# Patient Record
Sex: FEMALE | Race: WHITE | NOT HISPANIC OR LATINO | Employment: FULL TIME | URBAN - METROPOLITAN AREA
[De-identification: names, ages, dates, MRNs, and addresses within clinical notes are randomized per-mention and may not be internally consistent; named-entity substitution may affect disease eponyms.]

---

## 2017-02-09 ENCOUNTER — OFFICE VISIT (OUTPATIENT)
Dept: INTERNAL MEDICINE | Facility: CLINIC | Age: 22
End: 2017-02-09
Payer: COMMERCIAL

## 2017-02-09 ENCOUNTER — LAB VISIT (OUTPATIENT)
Dept: LAB | Facility: HOSPITAL | Age: 22
End: 2017-02-09
Attending: INTERNAL MEDICINE
Payer: COMMERCIAL

## 2017-02-09 ENCOUNTER — PATIENT MESSAGE (OUTPATIENT)
Dept: INTERNAL MEDICINE | Facility: CLINIC | Age: 22
End: 2017-02-09

## 2017-02-09 VITALS
WEIGHT: 197.31 LBS | SYSTOLIC BLOOD PRESSURE: 132 MMHG | HEART RATE: 82 BPM | BODY MASS INDEX: 38.74 KG/M2 | DIASTOLIC BLOOD PRESSURE: 84 MMHG | TEMPERATURE: 99 F | HEIGHT: 60 IN

## 2017-02-09 DIAGNOSIS — F32.89 OTHER DEPRESSION: ICD-10-CM

## 2017-02-09 DIAGNOSIS — F32.89 OTHER DEPRESSION: Primary | ICD-10-CM

## 2017-02-09 LAB
ALBUMIN SERPL BCP-MCNC: 4 G/DL
ALP SERPL-CCNC: 90 U/L
ALT SERPL W/O P-5'-P-CCNC: 34 U/L
ANION GAP SERPL CALC-SCNC: 9 MMOL/L
AST SERPL-CCNC: 24 U/L
BASOPHILS # BLD AUTO: 0.06 K/UL
BASOPHILS NFR BLD: 0.6 %
BILIRUB SERPL-MCNC: 0.5 MG/DL
BUN SERPL-MCNC: 9 MG/DL
CALCIUM SERPL-MCNC: 9.4 MG/DL
CHLORIDE SERPL-SCNC: 106 MMOL/L
CO2 SERPL-SCNC: 23 MMOL/L
CREAT SERPL-MCNC: 0.8 MG/DL
DIFFERENTIAL METHOD: ABNORMAL
EOSINOPHIL # BLD AUTO: 0.1 K/UL
EOSINOPHIL NFR BLD: 0.5 %
ERYTHROCYTE [DISTWIDTH] IN BLOOD BY AUTOMATED COUNT: 13.2 %
EST. GFR  (AFRICAN AMERICAN): >60 ML/MIN/1.73 M^2
EST. GFR  (NON AFRICAN AMERICAN): >60 ML/MIN/1.73 M^2
GLUCOSE SERPL-MCNC: 70 MG/DL
HCT VFR BLD AUTO: 44.4 %
HGB BLD-MCNC: 15.1 G/DL
LYMPHOCYTES # BLD AUTO: 2.3 K/UL
LYMPHOCYTES NFR BLD: 21.8 %
MCH RBC QN AUTO: 31.9 PG
MCHC RBC AUTO-ENTMCNC: 34 %
MCV RBC AUTO: 94 FL
MONOCYTES # BLD AUTO: 0.6 K/UL
MONOCYTES NFR BLD: 5.4 %
NEUTROPHILS # BLD AUTO: 7.7 K/UL
NEUTROPHILS NFR BLD: 71.4 %
PLATELET # BLD AUTO: 247 K/UL
PMV BLD AUTO: 11.8 FL
POTASSIUM SERPL-SCNC: 4.1 MMOL/L
PROT SERPL-MCNC: 7.3 G/DL
RBC # BLD AUTO: 4.73 M/UL
SODIUM SERPL-SCNC: 138 MMOL/L
TSH SERPL DL<=0.005 MIU/L-ACNC: 1.51 UIU/ML
WBC # BLD AUTO: 10.73 K/UL

## 2017-02-09 PROCEDURE — 36415 COLL VENOUS BLD VENIPUNCTURE: CPT | Mod: PO

## 2017-02-09 PROCEDURE — 80053 COMPREHEN METABOLIC PANEL: CPT

## 2017-02-09 PROCEDURE — 85025 COMPLETE CBC W/AUTO DIFF WBC: CPT

## 2017-02-09 PROCEDURE — 99999 PR PBB SHADOW E&M-NEW PATIENT-LVL III: CPT | Mod: PBBFAC,,, | Performed by: INTERNAL MEDICINE

## 2017-02-09 PROCEDURE — 99203 OFFICE O/P NEW LOW 30 MIN: CPT | Mod: 25,S$GLB,, | Performed by: INTERNAL MEDICINE

## 2017-02-09 PROCEDURE — 84443 ASSAY THYROID STIM HORMONE: CPT

## 2017-02-09 PROCEDURE — 90471 IMMUNIZATION ADMIN: CPT | Mod: S$GLB,,, | Performed by: INTERNAL MEDICINE

## 2017-02-09 PROCEDURE — 90734 MENACWYD/MENACWYCRM VACC IM: CPT | Mod: S$GLB,,, | Performed by: INTERNAL MEDICINE

## 2017-02-09 RX ORDER — LEVONORGESTREL / ETHINYL ESTRADIOL AND ETHINYL ESTRADIOL 150-30(84)
1 KIT ORAL DAILY
Refills: 0 | COMMUNITY
Start: 2017-01-20 | End: 2021-03-11

## 2017-02-09 RX ORDER — ESCITALOPRAM OXALATE 10 MG/1
10 TABLET ORAL DAILY
Qty: 30 TABLET | Refills: 11 | Status: SHIPPED | OUTPATIENT
Start: 2017-02-09 | End: 2017-03-15 | Stop reason: ALTCHOICE

## 2017-02-09 NOTE — PROGRESS NOTES
Subjective:       Patient ID: Elida Pickard is a 21 y.o. female.    Chief Complaint: Establish Care    HPI patient is a 21-year-old female presenting today to establish care.  She expressed some concerns about the possibility of depression.  She states for review she has suffered with symptoms of depression.  She has never been treated for depression.  She notes that her father had depression issues and she committed suicide when she was 13 years old.  She states that ever since then she had had off and on issues with some bowel movement.  She relates presently that she's been feeling a little depressed.  She has been noting a lack of motivation.  She's had a hard time getting up going to work and going to class.  She does not have any suicidality or homicidality.  She does relate some evidence of symptoms or could correlate with hypomania.  We went through a bipolar questionnaire and she is essentially answered in the affirmative to all the questions.  There was no evidence of severe symptoms so ever.  She describes that these manic type symptoms did not have significant impact on her life.  She does describe that her recollections of her father are that he had periods of very high eyes and very low lows.    Review of Systems   Constitutional: Negative for chills and fever.   HENT: Negative for hearing loss.    Eyes: Negative for photophobia and visual disturbance.   Respiratory: Negative for cough, shortness of breath and wheezing.    Cardiovascular: Negative for chest pain and palpitations.   Gastrointestinal: Negative for blood in stool, constipation, nausea and vomiting.   Genitourinary: Negative for dysuria and hematuria.   Musculoskeletal: Negative for neck pain and neck stiffness.   Skin: Negative for rash.   Neurological: Negative for syncope, weakness, light-headedness and headaches.   Hematological: Negative for adenopathy.   Psychiatric/Behavioral: Positive for dysphoric mood. The patient is not  "nervous/anxious.        Objective:     Visit Vitals    /84    Pulse 82    Temp 99.1 °F (37.3 °C)    Ht 4' 11.5" (1.511 m)    Wt 89.5 kg (197 lb 5 oz)    LMP 01/31/2017    BMI 39.19 kg/m2        Physical Exam   Constitutional: She is oriented to person, place, and time. She appears well-developed and well-nourished.   HENT:   Head: Normocephalic and atraumatic.   Eyes: EOM are normal. Pupils are equal, round, and reactive to light.   Neck: Normal range of motion. Neck supple. No thyromegaly present.   Cardiovascular: Normal rate, regular rhythm and intact distal pulses.  Exam reveals no gallop and no friction rub.    No murmur heard.  Pulmonary/Chest: Breath sounds normal. She has no wheezes. She has no rales. She exhibits no tenderness.   Abdominal: Soft. Bowel sounds are normal. She exhibits no distension and no mass. There is no tenderness. There is no rebound and no guarding.   Musculoskeletal: She exhibits no edema.   Lymphadenopathy:     She has no cervical adenopathy.   Neurological: She is alert and oriented to person, place, and time. She has normal reflexes. She displays normal reflexes. No cranial nerve deficit.   Skin: Skin is warm and dry. No rash noted.   Psychiatric: She has a normal mood and affect. Her behavior is normal. Judgment normal.   Vitals reviewed.      No results found for any previous visit.    Assessment:       1. Other depression        Plan:   No problem-specific Assessment & Plan notes found for this encounter.    Elida was seen today for [facility] care.    Diagnoses and all orders for this visit:    Other depression  -     CBC auto differential; Future  -     Comprehensive metabolic panel; Future  -     TSH; Future  -     escitalopram oxalate (LEXAPRO) 10 MG tablet; Take 1 tablet (10 mg total) by mouth once daily.  -     Ambulatory referral to Psychiatry    Other orders  -     Discontinue: meningococcal polysaccharide injection 0.5 mL; Inject 0.5 mLs into the muscle one " time.  -     Meningococcal Conjugate - MCV4P (MENACTRA)     patient describes symptoms of depression with possible hypomanic aspects.  I talked to her about the definitions of depression versus bipolar disorder and she expressed understanding these issues.  I'm going to go ahead and start her on Lexapro 10 mg once daily.  Effects and side effects were discussed.  We will get some lab work to rule out any thyroid issues.  I've cautioned her to watch for any swelling in her moods towards hypomania or autumn she indicates she will do so.  We will also make referral to psychiatry to see if we can definitively diagnose her situation at this point.  I will plan to see her back in 4 weeks or so sooner if necessary.

## 2017-02-09 NOTE — MR AVS SNAPSHOT
[facility]  [address] [place] [address]  Phone: [phone]  Fax: [phone]                  Elida Pickard   2017 1:40 PM   Office Visit    Description:  Female : 1995   Provider:  Tadeo Noguera MD   [facility]           Reason for Visit     Establish Care           Diagnoses this Visit        Comments    Other depression    -  Primary            To Do List           Future Appointments        Provider Department Dept Phone    2017 3:00 PM [facility] [phone]    3/9/2017 2:20 PM Tadeo Noguera MD [facility] [phone]      Goals (5 Years of Data)     None      Follow-Up and Disposition     Return in about 4 weeks (around 3/9/2017).       These Medications        Disp Refills Start End    escitalopram oxalate (LEXAPRO) 10 MG tablet 30 tablet 11 2017    Take 1 tablet (10 mg total) by mouth once daily. - Oral    Pharmacy: [facility]/pharmacy #[id number] - [place] - [address] AT [facility] #: [phone]         [facility] On Call     [facility] On Call Nurse Care Line -  Assistance  Registered nurses in the [facility] On Call Center provide clinical advisement, health education, appointment booking, and other advisory services.  Call for this free service at [phone].             Medications           Message regarding Medications     Verify the changes and/or additions to your medication regime listed below are the same as discussed with your clinician today.  If any of these changes or additions are incorrect, please notify your healthcare provider.        START taking these NEW medications        Refills    escitalopram oxalate (LEXAPRO) 10 MG tablet 11    Sig: Take 1 tablet (10 mg total) by mouth once daily.    Class: Normal    Route: Oral      These medications were administered today        Dose Freq     "meningococcal polysaccharide injection 0.5 mL 0.5 mL Clinic/HOD 1 time    Sig: Inject 0.5 mLs into the muscle one time.    Class: Normal    Route: Intramuscular           Verify that the below list of medications is an accurate representation of the medications you are currently taking.  If none reported, the list may be blank. If incorrect, please contact your healthcare provider. Carry this list with you in case of emergency.           Current Medications     CAMRESE 0.15 mg-30 mcg (84)/10 mcg (7) 3MPk Take 1 tablet by mouth once daily.    escitalopram oxalate (LEXAPRO) 10 MG tablet Take 1 tablet (10 mg total) by mouth once daily.           Clinical Reference Information           Your Vitals Were     BP Pulse Temp Height Weight Last Period    132/84 82 99.1 °F (37.3 °C) 4' 11.5" (1.511 m) 89.5 kg (197 lb 5 oz) 01/31/2017    BMI                39.19 kg/m2          Blood Pressure          Most Recent Value    BP  132/84      Allergies as of 2/9/2017     No Known Allergies      Immunizations Administered on Date of Encounter - 2/9/2017     Name Date Dose VIS Date Route    MENINGOCOCCAL  Incomplete 0.5 mL 3/31/2016 Intramuscular      Orders Placed During Today's Visit      Normal Orders This Visit    Ambulatory referral to Psychiatry     Future Labs/Procedures Expected by Expires    CBC auto differential  2/9/2017 2/9/2018    Comprehensive metabolic panel  2/9/2017 2/9/2018    TSH  2/9/2017 2/9/2018      Smoking Cessation     If you would like to quit smoking:   You may be eligible for free services if you are a [place] resident and started smoking cigarettes before September 1, 1988.  Call the Smoking Cessation Trust ([employer]) toll free at ([phone] 125-4053 or [phone] 881-7977.   Call 6-053-QUIT-NOW if you do not meet the above criteria.            Language Assistance Services     ATTENTION: Language assistance services are available, free of charge. Please call [phone].      ATENCIÓN: jacqueline Maynard " disposición servicios gratuitos de asistencia lingüística. Piedad al [phone].     QUINN Ý: N?u b?n nói Ti?ng Vi?t, có các d?ch v? h? tr? ngôn ng? mi?n phí dành cho b?n. G?i s? [phone].         [facility] complies with applicable Federal civil rights laws and does not discriminate on the basis of race, color, national origin, age, disability, or sex.

## 2017-03-15 ENCOUNTER — OFFICE VISIT (OUTPATIENT)
Dept: INTERNAL MEDICINE | Facility: CLINIC | Age: 22
End: 2017-03-15
Payer: COMMERCIAL

## 2017-03-15 VITALS
BODY MASS INDEX: 40.8 KG/M2 | TEMPERATURE: 98 F | HEART RATE: 80 BPM | DIASTOLIC BLOOD PRESSURE: 82 MMHG | WEIGHT: 202.38 LBS | SYSTOLIC BLOOD PRESSURE: 138 MMHG | HEIGHT: 59 IN

## 2017-03-15 DIAGNOSIS — F32.89 OTHER DEPRESSION: Primary | ICD-10-CM

## 2017-03-15 PROCEDURE — 99213 OFFICE O/P EST LOW 20 MIN: CPT | Mod: S$GLB,,, | Performed by: INTERNAL MEDICINE

## 2017-03-15 PROCEDURE — 1160F RVW MEDS BY RX/DR IN RCRD: CPT | Mod: S$GLB,,, | Performed by: INTERNAL MEDICINE

## 2017-03-15 PROCEDURE — 99999 PR PBB SHADOW E&M-EST. PATIENT-LVL III: CPT | Mod: PBBFAC,,, | Performed by: INTERNAL MEDICINE

## 2017-03-15 RX ORDER — SERTRALINE HYDROCHLORIDE 50 MG/1
50 TABLET, FILM COATED ORAL DAILY
Qty: 30 TABLET | Refills: 11 | Status: SHIPPED | OUTPATIENT
Start: 2017-03-15 | End: 2017-04-11 | Stop reason: ALTCHOICE

## 2017-03-15 NOTE — PROGRESS NOTES
"Subjective:       Patient ID: Elida Pickard is a 21 y.o. female.    Chief Complaint: Follow-up    HPI  Patient is a 21-year-old female coming in today following up on her depression.  At her last visit we started her on Lexapro 10 mg daily.  She is here to report on the effects of the medication.  She states that it seems to be working for her depression.  She states that she feels like she's doing pretty well from that standpoint.  Unfortunately though she is having some side effects.  She notes that it makes her feel extremely sleepy and fatigued and she just doesn't feel like it's tolerated with the the side effects that she's having.  We had also made referral to psychiatry.  Their first appointment was April 11 so she is not seen them yet.  She has not demonstrated any symptoms of hypomania or autumn so it is we will continue to work along the process here at this point.      Review of Systems    Objective:   /82  Pulse 80  Temp 97.7 °F (36.5 °C) (Tympanic)   Ht 4' 11" (1.499 m)  Wt 91.8 kg (202 lb 6.1 oz)  BMI 40.88 kg/m2     Physical Exam   Constitutional: She appears well-developed and well-nourished.   HENT:   Head: Normocephalic and atraumatic.   Cardiovascular: Normal rate, regular rhythm and intact distal pulses.  Exam reveals no gallop and no friction rub.    No murmur heard.  Pulmonary/Chest: Breath sounds normal. She has no wheezes. She has no rales. She exhibits no tenderness.   Abdominal: Soft. Bowel sounds are normal. She exhibits no distension. There is no tenderness.   Lymphadenopathy:     She has no cervical adenopathy.   Skin: No rash noted.   Psychiatric: She has a normal mood and affect. Her behavior is normal. Thought content normal.   Vitals reviewed.      No visits with results within 2 Week(s) from this visit.  Latest known visit with results is:    Lab Visit on 02/09/2017   Component Date Value    WBC 02/09/2017 10.73     RBC 02/09/2017 4.73     Hemoglobin 02/09/2017 " 15.1     Hematocrit 02/09/2017 44.4     MCV 02/09/2017 94     MCH 02/09/2017 31.9*    MCHC 02/09/2017 34.0     RDW 02/09/2017 13.2     Platelets 02/09/2017 247     MPV 02/09/2017 11.8     Gran # 02/09/2017 7.7     Lymph # 02/09/2017 2.3     Mono # 02/09/2017 0.6     Eos # 02/09/2017 0.1     Baso # 02/09/2017 0.06     Gran% 02/09/2017 71.4     Lymph% 02/09/2017 21.8     Mono% 02/09/2017 5.4     Eosinophil% 02/09/2017 0.5     Basophil% 02/09/2017 0.6     Differential Method 02/09/2017 Automated     Sodium 02/09/2017 138     Potassium 02/09/2017 4.1     Chloride 02/09/2017 106     CO2 02/09/2017 23     Glucose 02/09/2017 70     BUN, Bld 02/09/2017 9     Creatinine 02/09/2017 0.8     Calcium 02/09/2017 9.4     Total Protein 02/09/2017 7.3     Albumin 02/09/2017 4.0     Total Bilirubin 02/09/2017 0.5     Alkaline Phosphatase 02/09/2017 90     AST 02/09/2017 24     ALT 02/09/2017 34     Anion Gap 02/09/2017 9     eGFR if African American 02/09/2017 >60.0     eGFR if non  Amer* 02/09/2017 >60.0     TSH 02/09/2017 1.507        Assessment:       1. Other depression        Plan:   No problem-specific Assessment & Plan notes found for this encounter.    Elida was seen today for follow-up.    Diagnoses and all orders for this visit:    Other depression  Comments:  Stop lexapro due to fatigue and sleepiness.  Start zoloft 50mg once daily.  Follow up with Dr. Ramsey as planned in April.  Follow up with me in May.  Orders:  -     sertraline (ZOLOFT) 50 MG tablet; Take 1 tablet (50 mg total) by mouth once daily.        Return in about 2 months (around 5/15/2017).

## 2017-03-15 NOTE — MR AVS SNAPSHOT
[facility]  [address] [place] [address]  Phone: [phone]  Fax: [phone]                  Elida Pickard   3/15/2017 2:00 PM   Office Visit    Description:  Female : 1995   Provider:  Tadeo Noguera MD   [facility]           Reason for Visit     Follow-up           Diagnoses this Visit        Comments    Other depression    -  Primary Stop lexapro due to fatigue and sleepiness.  Start zoloft 50mg once daily.  Follow up with Dr. Ramsey as planned in April.  Follow up with me in May.           To Do List           Future Appointments        Provider Department Dept Phone    5/15/2017 10:20 AM Tadeo Noguera MD [facility] [phone]      Goals (5 Years of Data)     None      Follow-Up and Disposition     Return in about 2 months (around 5/15/2017).       These Medications        Disp Refills Start End    sertraline (ZOLOFT) 50 MG tablet 30 tablet 11 3/15/2017 3/15/2018    Take 1 tablet (50 mg total) by mouth once daily. - Oral    Pharmacy: [facility]/pharmacy #[id number] - [place] - [address] [facility] #: [phone]         [facility] On Call     [facility] On Call Nurse Care Line -  Assistance  Registered nurses in the [facility] On Call Center provide clinical advisement, health education, appointment booking, and other advisory services.  Call for this free service at [phone].             Medications           Message regarding Medications     Verify the changes and/or additions to your medication regime listed below are the same as discussed with your clinician today.  If any of these changes or additions are incorrect, please notify your healthcare provider.        START taking these NEW medications        Refills    sertraline (ZOLOFT) 50 MG tablet 11    Sig: Take 1 tablet (50 mg total) by mouth once daily.    Class: Normal    Route: Oral      STOP taking these  "medications     escitalopram oxalate (LEXAPRO) 10 MG tablet Take 1 tablet (10 mg total) by mouth once daily.           Verify that the below list of medications is an accurate representation of the medications you are currently taking.  If none reported, the list may be blank. If incorrect, please contact your healthcare provider. Carry this list with you in case of emergency.           Current Medications     CAMRESE 0.15 mg-30 mcg (84)/10 mcg (7) 3MPk Take 1 tablet by mouth once daily.    sertraline (ZOLOFT) 50 MG tablet Take 1 tablet (50 mg total) by mouth once daily.           Clinical Reference Information           Your Vitals Were     BP Pulse Temp Height Weight BMI    138/82 80 97.7 °F (36.5 °C) (Tympanic) 4' 11" (1.499 m) 91.8 kg (202 lb 6.1 oz) 40.88 kg/m2      Blood Pressure          Most Recent Value    BP  138/82      Allergies as of 3/15/2017     No Known Allergies      Immunizations Administered on Date of Encounter - 3/15/2017     None      Smoking Cessation     If you would like to quit smoking:   You may be eligible for free services if you are a [place] resident and started smoking cigarettes before September 1, 1988.  Call the Smoking Cessation Trust ([facility]) toll free at (595) 226-4641 or [phone]) 108-0777.   Call -726-QUIT-NOW if you do not meet the above criteria.            Language Assistance Services     ATTENTION: Language assistance services are available, free of charge. Please call [phone].      ATENCIÓN: Si habla español, tiene a sousa disposición servicios gratuitos de asistencia lingüística. Llame al [phone].     [facility] Ý: N?u b?n nói Ti?ng Vi?t, có các d?ch v? h? tr? ngôn ng? mi?n phí dành cho b?n. G?i s? [phone].         [facility] complies with applicable Federal civil rights laws and does not discriminate on the basis of race, color, national origin, age, disability, or sex.        "

## 2017-04-11 ENCOUNTER — INITIAL CONSULT (OUTPATIENT)
Dept: PSYCHIATRY | Facility: CLINIC | Age: 22
End: 2017-04-11
Payer: COMMERCIAL

## 2017-04-11 DIAGNOSIS — F32.1 MDD (MAJOR DEPRESSIVE DISORDER), SINGLE EPISODE, MODERATE: Primary | ICD-10-CM

## 2017-04-11 PROCEDURE — 90792 PSYCH DIAG EVAL W/MED SRVCS: CPT | Mod: S$GLB,,, | Performed by: PSYCHIATRY & NEUROLOGY

## 2017-04-11 RX ORDER — ESCITALOPRAM OXALATE 10 MG/1
10 TABLET ORAL DAILY
Qty: 30 TABLET | Refills: 2 | Status: SHIPPED | OUTPATIENT
Start: 2017-04-11 | End: 2018-10-04 | Stop reason: SDUPTHER

## 2017-04-26 NOTE — PROGRESS NOTES
Outpatient Psychiatry Initial Visit (MD/NP)    4/11/2017    Elida Pickard, a 21 y.o. female, presenting for initial evaluation visit. Met with patient.    Reason for Encounter: Referral from Dr. Noguera. Patient complains of depression, anxiety    History of Present Illness: 20 y/o WF referred by PCP for evaluation of low motivation, decreased pleasure/enjoyment of activities, anergia. Reports symptoms present x months, saw Dr. Noguera in February. From his note:     ...expressed some concerns about the possibility of depression... Has suffered with symptoms of depression.. never been treated for depression... her father had depression issues and he committed suicide when she was 13 years old. She states that ever since then she had had off and on issues with some bowel movement. She relates presently that she's been feeling a little depressed. She has been noting a lack of motivation. She's had a hard time getting up going to work and going to class. She does not have any suicidality or homicidality. She does relate some evidence of symptoms or could correlate with hypomania. We went through a bipolar questionnaire and she is essentially answered in the affirmative to all the questions. There was no evidence of severe symptoms so ever. She describes that these manic type symptoms did not have significant impact on her life. She does describe that her recollections of her father are that he had periods of very high eyes and very low lows.    Then on follow-up in March,     At her last visit we started her on Lexapro 10 mg daily. She is here to report on the effects of the medication. She states that it seems to be working for her depression. She states that she feels like she's doing pretty well from that standpoint. Unfortunately though she is having some side effects. She notes that it makes her feel extremely sleepy and fatigued and she just doesn't feel like it's tolerated with the the side effects that  she's having. We had also made referral to psychiatry. Their first appointment was April 11 so she is not seen them yet. She has not demonstrated any symptoms of hypomania or autumn so it is we will continue to work along the process here at this point.    Lexapro was discontinued sertraline started. Experienced new onset SI after started sertraline & she subsequently self-discontinued this medication & hasn't had SI since. Symptoms otherwise are ongoing largely as prior to treatment. Denies periods of prolonged euphoria or irritable moods for days or longer (max x hours). No hallucinations or delusions.     Past Psych hx: no formal psych assessments; no hx of self-harm behaviors, psych hospitalization, hallucinations, delusions.   Famhx: as above (father committed suicide when patient was 13); mother & maternal GM suffer depression  Social & medical hx reviewed    Review Of Systems:     GENERAL:  No weight gain or loss  SKIN:  No rashes or lacerations  HEAD:  No headaches  EYES:  No exophthalmos, jaundice or blindness  EARS:  No dizziness, tinnitus or hearing loss  NOSE:  No changes in smell  MOUTH & THROAT:  No dyskinetic movements or obvious goiter  CHEST:  No shortness of breath, hyperventilation or cough  CARDIOVASCULAR:  No tachycardia or chest pain  ABDOMEN:  No nausea, vomiting, pain, constipation or diarrhea  URINARY:  No frequency, dysuria or sexual dysfunction  ENDOCRINE:  No polydipsia, polyuria  MUSCULOSKELETAL:  No pain or stiffness of the joints  NEUROLOGIC:  No weakness, sensory changes, seizures, confusion, memory loss, tremor or other abnormal movements    Current Evaluation:     Nutritional Screening: Considering the patient's height and weight, medications, medical history and preferences, should a referral be made to the dietitian? no    Constitutional  Vitals:  Most recent vital signs, dated less than 90 days prior to this appointment, were not reviewed.    There were no vitals filed for this  "visit.     General:  unremarkable, age appropriate     Musculoskeletal  Muscle Strength/Tone:  no tremor, no tic   Gait & Station:  non-ataxic     Psychiatric  Appearance: casually dressed & groomed;   Behavior: calm,   Cooperation: cooperative with assessment  Speech: normal rate, volume, tone  Thought Process: linear, goal-directed  Thought Content: No suicidal or homicidal ideation; no delusions  Affect: restricted  Mood: "blah"  Perceptions: No auditory or visual hallucinations  Level of Consciousness: alert throughout interview  Insight: fair  Cognition: Oriented to person, place, time, & situation  Memory: no apparent deficits to general clinical interview; not formally assessed  Attention/Concentration: no apparent deficits to general clinical interview; not formally assessed  Fund of Knowledge: average by vocabulary/education    Laboratory Data  No visits with results within 1 Month(s) from this visit.  Latest known visit with results is:    Lab Visit on 02/09/2017   Component Date Value Ref Range Status    WBC 02/09/2017 10.73  3.90 - 12.70 K/uL Final    RBC 02/09/2017 4.73  4.00 - 5.40 M/uL Final    Hemoglobin 02/09/2017 15.1  12.0 - 16.0 g/dL Final    Hematocrit 02/09/2017 44.4  37.0 - 48.5 % Final    MCV 02/09/2017 94  82 - 98 fL Final    MCH 02/09/2017 31.9* 27.0 - 31.0 pg Final    MCHC 02/09/2017 34.0  32.0 - 36.0 % Final    RDW 02/09/2017 13.2  11.5 - 14.5 % Final    Platelets 02/09/2017 247  150 - 350 K/uL Final    MPV 02/09/2017 11.8  9.2 - 12.9 fL Final    Gran # 02/09/2017 7.7  1.8 - 7.7 K/uL Final    Lymph # 02/09/2017 2.3  1.0 - 4.8 K/uL Final    Mono # 02/09/2017 0.6  0.3 - 1.0 K/uL Final    Eos # 02/09/2017 0.1  0.0 - 0.5 K/uL Final    Baso # 02/09/2017 0.06  0.00 - 0.20 K/uL Final    Gran% 02/09/2017 71.4  38.0 - 73.0 % Final    Lymph% 02/09/2017 21.8  18.0 - 48.0 % Final    Mono% 02/09/2017 5.4  4.0 - 15.0 % Final    Eosinophil% 02/09/2017 0.5  0.0 - 8.0 % Final    " Basophil% 02/09/2017 0.6  0.0 - 1.9 % Final    Differential Method 02/09/2017 Automated   Final    Sodium 02/09/2017 138  136 - 145 mmol/L Final    Potassium 02/09/2017 4.1  3.5 - 5.1 mmol/L Final    Chloride 02/09/2017 106  95 - 110 mmol/L Final    CO2 02/09/2017 23  23 - 29 mmol/L Final    Glucose 02/09/2017 70  70 - 110 mg/dL Final    BUN, Bld 02/09/2017 9  6 - 20 mg/dL Final    Creatinine 02/09/2017 0.8  0.5 - 1.4 mg/dL Final    Calcium 02/09/2017 9.4  8.7 - 10.5 mg/dL Final    Total Protein 02/09/2017 7.3  6.0 - 8.4 g/dL Final    Albumin 02/09/2017 4.0  3.5 - 5.2 g/dL Final    Total Bilirubin 02/09/2017 0.5  0.1 - 1.0 mg/dL Final    Alkaline Phosphatase 02/09/2017 90  55 - 135 U/L Final    AST 02/09/2017 24  10 - 40 U/L Final    ALT 02/09/2017 34  10 - 44 U/L Final    Anion Gap 02/09/2017 9  8 - 16 mmol/L Final    eGFR if African American 02/09/2017 >60.0  >60 mL/min/1.73 m^2 Final    eGFR if non African American 02/09/2017 >60.0  >60 mL/min/1.73 m^2 Final    TSH 02/09/2017 1.507  0.400 - 4.000 uIU/mL Final     Medications  Outpatient Encounter Prescriptions as of 4/11/2017   Medication Sig Dispense Refill    CAMRESE 0.15 mg-30 mcg (84)/10 mcg (7) 3MPk Take 1 tablet by mouth once daily.  0    escitalopram oxalate (LEXAPRO) 10 MG tablet Take 1 tablet (10 mg total) by mouth once daily. 30 tablet 2    [DISCONTINUED] sertraline (ZOLOFT) 50 MG tablet Take 1 tablet (50 mg total) by mouth once daily. 30 tablet 11     No facility-administered encounter medications on file as of 4/11/2017.        Assessment - Diagnosis - Goals:     Impression: 20 y/o WF with prolonged anhedonia, self-reproach, anergia, delcine in fucntioning. Also has problems with anxiety in public settings, particularly crowds. Lexapro was helpful for moods but sedating. Sertraline may have induced SI.     Dx: major depressive disorder    Treatment Goals:  Specify outcomes written in observable, behavioral terms:   Decrease  anxiety symptoms by self-report questionnaire    Treatment Plan/Recommendations:   · lexapro 10 mg qhs. Discussed risks, benefits, and alternatives to treatment plan documented above with patient. I answered all patient questions related to this plan and patient expressed understanding and agreement.   · Discussed self-care in early recovery. Encouraged psychotherapy ([facility] student mental health vs. [facility]). She'll consider.     Return to Clinic: 6 weeks    Counseling time: 10 minutes  Total time: 50 minutes    CHLOE Wilburn MD

## 2017-05-15 ENCOUNTER — OFFICE VISIT (OUTPATIENT)
Dept: INTERNAL MEDICINE | Facility: CLINIC | Age: 22
End: 2017-05-15
Payer: COMMERCIAL

## 2017-05-15 VITALS
BODY MASS INDEX: 40.52 KG/M2 | DIASTOLIC BLOOD PRESSURE: 70 MMHG | SYSTOLIC BLOOD PRESSURE: 120 MMHG | TEMPERATURE: 98 F | HEART RATE: 80 BPM | WEIGHT: 206.38 LBS | HEIGHT: 60 IN

## 2017-05-15 DIAGNOSIS — F32.5 MAJOR DEPRESSIVE DISORDER WITH SINGLE EPISODE, IN REMISSION: Primary | ICD-10-CM

## 2017-05-15 PROCEDURE — 1160F RVW MEDS BY RX/DR IN RCRD: CPT | Mod: S$GLB,,, | Performed by: INTERNAL MEDICINE

## 2017-05-15 PROCEDURE — 99213 OFFICE O/P EST LOW 20 MIN: CPT | Mod: S$GLB,,, | Performed by: INTERNAL MEDICINE

## 2017-05-15 PROCEDURE — 99999 PR PBB SHADOW E&M-EST. PATIENT-LVL III: CPT | Mod: PBBFAC,,, | Performed by: INTERNAL MEDICINE

## 2017-05-15 NOTE — PROGRESS NOTES
"Subjective:       Patient ID: Elida Pickard is a 21 y.o. female.    Chief Complaint: Follow-up    HPI  patient is a 21-year-old female following up on depression today.  Since I last saw her she did see Dr. Vasquez.  I have placed her on Zoloft as she had wanted to try something other than the Lexapro.  Unfortunate she did not tolerate the Zoloft well at all.  She started having some suicidal ideations.  When she saw a psychiatrist this brought up and he switched her back to the Lexapro.  She states at this time she is doing very well with Lexapro.  She states her symptoms are controlled at this time and she is feeling quite stable and good.  She states that the psychiatrist as referred her back to me and let she has further problems.    Review of Systems    Objective:   /70  Pulse 80  Temp 97.9 °F (36.6 °C)  Ht 4' 11.5" (1.511 m)  Wt 93.6 kg (206 lb 5.6 oz)  BMI 40.98 kg/m2     Physical Exam   Constitutional: She appears well-developed and well-nourished.   HENT:   Head: Normocephalic and atraumatic.   Cardiovascular: Normal rate, regular rhythm and intact distal pulses.  Exam reveals no gallop and no friction rub.    No murmur heard.  Pulmonary/Chest: Breath sounds normal. She has no wheezes. She has no rales. She exhibits no tenderness.   Abdominal: Soft. Bowel sounds are normal. She exhibits no distension. There is no tenderness.   Lymphadenopathy:     She has no cervical adenopathy.   Skin: No rash noted.   Psychiatric: She has a normal mood and affect. Her behavior is normal. Thought content normal.   Vitals reviewed.      No visits with results within 2 Week(s) from this visit.  Latest known visit with results is:    Lab Visit on 02/09/2017   Component Date Value    WBC 02/09/2017 10.73     RBC 02/09/2017 4.73     Hemoglobin 02/09/2017 15.1     Hematocrit 02/09/2017 44.4     MCV 02/09/2017 94     MCH 02/09/2017 31.9*    MCHC 02/09/2017 34.0     RDW 02/09/2017 13.2     Platelets " 02/09/2017 247     MPV 02/09/2017 11.8     Gran # 02/09/2017 7.7     Lymph # 02/09/2017 2.3     Mono # 02/09/2017 0.6     Eos # 02/09/2017 0.1     Baso # 02/09/2017 0.06     Gran% 02/09/2017 71.4     Lymph% 02/09/2017 21.8     Mono% 02/09/2017 5.4     Eosinophil% 02/09/2017 0.5     Basophil% 02/09/2017 0.6     Differential Method 02/09/2017 Automated     Sodium 02/09/2017 138     Potassium 02/09/2017 4.1     Chloride 02/09/2017 106     CO2 02/09/2017 23     Glucose 02/09/2017 70     BUN, Bld 02/09/2017 9     Creatinine 02/09/2017 0.8     Calcium 02/09/2017 9.4     Total Protein 02/09/2017 7.3     Albumin 02/09/2017 4.0     Total Bilirubin 02/09/2017 0.5     Alkaline Phosphatase 02/09/2017 90     AST 02/09/2017 24     ALT 02/09/2017 34     Anion Gap 02/09/2017 9     eGFR if African American 02/09/2017 >60.0     eGFR if non  Amer* 02/09/2017 >60.0     TSH 02/09/2017 1.507        Assessment:       1. Major depressive disorder with single episode, in remission        Plan:   Major depressive disorder with single episode, in remission  Continue lexapro.  No issues at this time.  Follow up in six weeks.    Elida was seen today for follow-up.    Diagnoses and all orders for this visit:    Major depressive disorder with single episode, in remission        Return in about 6 weeks (around 6/26/2017).

## 2017-05-15 NOTE — MR AVS SNAPSHOT
"    [facility]  [address] [place] [address]  Phone: [phone]  Fax: [phone]                  Elida Pickard   5/15/2017 10:20 AM   Office Visit    Description:  Female : 1995   Provider:  Tadeo Noguera MD   [facility]           Reason for Visit     Follow-up           Diagnoses this Visit        Comments    Major depressive disorder with single episode, in remission    -  Primary            To Do List           Goals (5 Years of Data)     None      [facility]     [facility] On [facility] Nurse Care Line -  Assistance  Unless otherwise directed by your provider, please contact [facility], our nurse care line that is available for  assistance.     Registered nurses in the [facility] On Call Center provide: appointment scheduling, clinical advisement, health education, and other advisory services.  Call: [phone] (toll free)               Medications           Message regarding Medications     Verify the changes and/or additions to your medication regime listed below are the same as discussed with your clinician today.  If any of these changes or additions are incorrect, please notify your healthcare provider.             Verify that the below list of medications is an accurate representation of the medications you are currently taking.  If none reported, the list may be blank. If incorrect, please contact your healthcare provider. Carry this list with you in case of emergency.           Current Medications     CAMRESE 0.15 mg-30 mcg (84)/10 mcg (7) 3MPk Take 1 tablet by mouth once daily.    escitalopram oxalate (LEXAPRO) 10 MG tablet Take 1 tablet (10 mg total) by mouth once daily.           Clinical Reference Information           Your Vitals Were     BP Pulse Temp Height Weight BMI    120/70 80 97.9 °F (36.6 °C) 4' 11.5" (1.511 m) 93.6 kg (206 lb 5.6 oz) 40.98 kg/m2      Blood Pressure          Most Recent " Value    BP  120/70      Allergies as of 5/15/2017     No Known Allergies      Immunizations Administered on Date of Encounter - 5/15/2017     None      Smoking Cessation     If you would like to quit smoking:   You may be eligible for free services if you are a [place] resident and started smoking cigarettes before September 1, 1988.  Call the Smoking Cessation Trust (SCT) toll free at (542) 138-6882 or (897) 663-9418.   Call 1-800-QUIT-NOW if you do not meet the above criteria.   Contact us via email: [email or web address]   View our website for more information: [email or web address]        Language Assistance Services     ATTENTION: Language assistance services are available, free of charge. Please call [phone].      ATENCIÓN: Si hamilton beckerkaitlynn, tiene a sousa disposición servicios gratuitos de asistencia lingüística. Llame al [phone].     QUINN Ý: N?u b?n nói Ti?ng Vi?t, có các d?ch v? h? tr? ngôn ng? mi?n phí dành cho b?n. G?i s? [phone].         [facility] complies with applicable Federal civil rights laws and does not discriminate on the basis of race, color, national origin, age, disability, or sex.

## 2017-06-14 ENCOUNTER — PATIENT OUTREACH (OUTPATIENT)
Dept: ADMINISTRATIVE | Facility: HOSPITAL | Age: 22
End: 2017-06-14

## 2017-06-26 ENCOUNTER — OFFICE VISIT (OUTPATIENT)
Dept: INTERNAL MEDICINE | Facility: CLINIC | Age: 22
End: 2017-06-26
Payer: COMMERCIAL

## 2017-06-26 VITALS
WEIGHT: 207.88 LBS | TEMPERATURE: 100 F | SYSTOLIC BLOOD PRESSURE: 120 MMHG | DIASTOLIC BLOOD PRESSURE: 70 MMHG | HEART RATE: 80 BPM | HEIGHT: 59 IN | BODY MASS INDEX: 41.91 KG/M2

## 2017-06-26 DIAGNOSIS — Z72.0 TOBACCO ABUSE: ICD-10-CM

## 2017-06-26 DIAGNOSIS — F32.5 MAJOR DEPRESSIVE DISORDER WITH SINGLE EPISODE, IN REMISSION: ICD-10-CM

## 2017-06-26 PROCEDURE — 99999 PR PBB SHADOW E&M-EST. PATIENT-LVL III: CPT | Mod: PBBFAC,,, | Performed by: INTERNAL MEDICINE

## 2017-06-26 PROCEDURE — 99213 OFFICE O/P EST LOW 20 MIN: CPT | Mod: S$GLB,,, | Performed by: INTERNAL MEDICINE

## 2017-06-26 RX ORDER — IBUPROFEN 200 MG
1 TABLET ORAL DAILY
Qty: 42 PATCH | Refills: 0 | Status: SHIPPED | OUTPATIENT
Start: 2017-06-26 | End: 2018-10-04

## 2017-06-26 NOTE — PROGRESS NOTES
"Subjective:       Patient ID: Elida Pickard is a 22 y.o. female.    Chief Complaint: Follow-up and Nicotine Dependence (wants to stop)    HPI  Patient is a 22-year-old female coming in today following up on her depression.  She indicates she's been doing well.  Since the Lexapro is working well for this time.  She is not having any of the issues that she had when she initially went on the Lexapro.  Generally speaking she feels like she's been doing well with it.    She wanted to discuss smoking cessation at this point.  She heard good things about Chantix and wanted see about using it.  She states she started smoking when she was 14 years old and is been smoking about a pack a day for the last few years.  She tried to go cold turkey in the past without good response.  I talked to her today about the various options available including Chantix.  I indicated to her that did not think Chantix was be the best option for her due to her depressive symptoms and she expressed understanding.  We talked about nicotine patches as a good alternative and the I described to her the method of use and she is interested in trying that.    Review of Systems    Objective:   /70   Pulse 80   Temp 99.6 °F (37.6 °C) (Tympanic)   Ht 4' 11" (1.499 m)   Wt 94.3 kg (207 lb 14.3 oz)   BMI 41.99 kg/m²      Physical Exam   Constitutional: She appears well-developed and well-nourished.   HENT:   Head: Normocephalic and atraumatic.   Cardiovascular: Normal rate, regular rhythm and intact distal pulses.  Exam reveals no gallop and no friction rub.    No murmur heard.  Pulmonary/Chest: Breath sounds normal. She has no wheezes. She has no rales. She exhibits no tenderness.   Abdominal: Soft. Bowel sounds are normal. She exhibits no distension. There is no tenderness.   Lymphadenopathy:     She has no cervical adenopathy.   Skin: No rash noted.   Psychiatric: She has a normal mood and affect. Her behavior is normal. Thought content " normal.   Vitals reviewed.      No visits with results within 2 Week(s) from this visit.   Latest known visit with results is:   Lab Visit on 02/09/2017   Component Date Value    WBC 02/09/2017 10.73     RBC 02/09/2017 4.73     Hemoglobin 02/09/2017 15.1     Hematocrit 02/09/2017 44.4     MCV 02/09/2017 94     MCH 02/09/2017 31.9*    MCHC 02/09/2017 34.0     RDW 02/09/2017 13.2     Platelets 02/09/2017 247     MPV 02/09/2017 11.8     Gran # 02/09/2017 7.7     Lymph # 02/09/2017 2.3     Mono # 02/09/2017 0.6     Eos # 02/09/2017 0.1     Baso # 02/09/2017 0.06     Gran% 02/09/2017 71.4     Lymph% 02/09/2017 21.8     Mono% 02/09/2017 5.4     Eosinophil% 02/09/2017 0.5     Basophil% 02/09/2017 0.6     Differential Method 02/09/2017 Automated     Sodium 02/09/2017 138     Potassium 02/09/2017 4.1     Chloride 02/09/2017 106     CO2 02/09/2017 23     Glucose 02/09/2017 70     BUN, Bld 02/09/2017 9     Creatinine 02/09/2017 0.8     Calcium 02/09/2017 9.4     Total Protein 02/09/2017 7.3     Albumin 02/09/2017 4.0     Total Bilirubin 02/09/2017 0.5     Alkaline Phosphatase 02/09/2017 90     AST 02/09/2017 24     ALT 02/09/2017 34     Anion Gap 02/09/2017 9     eGFR if African American 02/09/2017 >60.0     eGFR if non  Amer* 02/09/2017 >60.0     TSH 02/09/2017 1.507        Assessment:       1. Major depressive disorder with single episode, in remission    2. Tobacco abuse        Plan:   Major depressive disorder with single episode, in remission  Continue lexapro at this time. No changes necessary.    Elida was seen today for follow-up and nicotine dependence.    Diagnoses and all orders for this visit:    Major depressive disorder with single episode, in remission    Tobacco abuse    Other orders  -     nicotine (NICODERM CQ) 21 mg/24 hr; Place 1 patch onto the skin once daily.        Return in about 4 months (around 10/26/2017), or if symptoms worsen or fail to improve.

## 2017-08-07 RX ORDER — ESCITALOPRAM OXALATE 10 MG/1
10 TABLET ORAL DAILY
Qty: 30 TABLET | Refills: 2 | Status: CANCELLED | OUTPATIENT
Start: 2017-08-07

## 2017-09-14 ENCOUNTER — OFFICE VISIT (OUTPATIENT)
Dept: URGENT CARE | Facility: CLINIC | Age: 22
End: 2017-09-14
Payer: COMMERCIAL

## 2017-09-14 VITALS
WEIGHT: 195.56 LBS | BODY MASS INDEX: 38.39 KG/M2 | TEMPERATURE: 98 F | DIASTOLIC BLOOD PRESSURE: 80 MMHG | HEIGHT: 60 IN | OXYGEN SATURATION: 98 % | SYSTOLIC BLOOD PRESSURE: 120 MMHG | HEART RATE: 108 BPM

## 2017-09-14 DIAGNOSIS — J02.9 SORE THROAT: ICD-10-CM

## 2017-09-14 DIAGNOSIS — R09.81 SINUS CONGESTION: ICD-10-CM

## 2017-09-14 DIAGNOSIS — J02.9 PHARYNGITIS, UNSPECIFIED ETIOLOGY: Primary | ICD-10-CM

## 2017-09-14 LAB
CTP QC/QA: YES
CTP QC/QA: YES
HETEROPH AB SER QL: NEGATIVE
S PYO RRNA THROAT QL PROBE: NEGATIVE

## 2017-09-14 PROCEDURE — 99999 PR PBB SHADOW E&M-EST. PATIENT-LVL IV: CPT | Mod: PBBFAC,,, | Performed by: NURSE PRACTITIONER

## 2017-09-14 PROCEDURE — 87880 STREP A ASSAY W/OPTIC: CPT | Mod: QW,S$GLB,, | Performed by: NURSE PRACTITIONER

## 2017-09-14 PROCEDURE — 3008F BODY MASS INDEX DOCD: CPT | Mod: S$GLB,,, | Performed by: NURSE PRACTITIONER

## 2017-09-14 PROCEDURE — 99214 OFFICE O/P EST MOD 30 MIN: CPT | Mod: 25,S$GLB,, | Performed by: NURSE PRACTITIONER

## 2017-09-14 PROCEDURE — 87081 CULTURE SCREEN ONLY: CPT

## 2017-09-14 PROCEDURE — 86308 HETEROPHILE ANTIBODY SCREEN: CPT | Mod: QW,S$GLB,, | Performed by: NURSE PRACTITIONER

## 2017-09-14 PROCEDURE — 87147 CULTURE TYPE IMMUNOLOGIC: CPT

## 2017-09-14 RX ORDER — LORATADINE 10 MG/1
10 TABLET ORAL DAILY
Qty: 30 TABLET | Refills: 0 | Status: SHIPPED | OUTPATIENT
Start: 2017-09-14 | End: 2019-02-21

## 2017-09-14 RX ORDER — FLUTICASONE PROPIONATE 50 MCG
2 SPRAY, SUSPENSION (ML) NASAL DAILY
Qty: 16 G | Refills: 0 | Status: SHIPPED | OUTPATIENT
Start: 2017-09-14 | End: 2017-10-14

## 2017-09-14 RX ORDER — AMOXICILLIN 875 MG/1
875 TABLET, FILM COATED ORAL 2 TIMES DAILY
Qty: 20 TABLET | Refills: 0 | Status: SHIPPED | OUTPATIENT
Start: 2017-09-14 | End: 2017-09-24

## 2017-09-14 NOTE — PROGRESS NOTES
Subjective:       Patient ID: Elida Pickard is a 22 y.o. female.    Chief Complaint: Sore Throat    Pt is a 22 year old female to clinic today with complaints of ST, cough and congestion that began Monday.       Sore Throat    This is a new problem. The current episode started in the past 7 days. The problem has been gradually worsening. The pain is worse on the right side. The maximum temperature recorded prior to her arrival was 100.4 - 100.9 F. The fever has been present for less than 1 day. The pain is at a severity of 8/10. The pain is severe. Associated symptoms include congestion, coughing, a hoarse voice, a plugged ear sensation and trouble swallowing. Pertinent negatives include no abdominal pain, diarrhea, drooling, ear discharge, ear pain, headaches, neck pain, shortness of breath, stridor, swollen glands or vomiting. She has had exposure to mono. She has had no exposure to strep. Exposure to: brother. Treatments tried: claritin. The treatment provided no relief.     Review of Systems   Constitutional: Positive for fatigue and fever. Negative for chills and diaphoresis.   HENT: Positive for congestion, hoarse voice, sinus pressure, sore throat and trouble swallowing. Negative for drooling, ear discharge, ear pain, postnasal drip, rhinorrhea and sneezing.    Eyes: Negative for pain.   Respiratory: Positive for cough. Negative for chest tightness, shortness of breath, wheezing and stridor.    Cardiovascular: Negative for chest pain and palpitations.   Gastrointestinal: Negative for abdominal pain, diarrhea, nausea and vomiting.   Genitourinary: Negative for dysuria.   Musculoskeletal: Negative for back pain, myalgias and neck pain.   Skin: Negative for rash.   Neurological: Negative for dizziness, light-headedness and headaches.       Objective:      Physical Exam   Constitutional: She is oriented to person, place, and time. She appears well-developed and well-nourished. No distress.   HENT:   Head:  Normocephalic.   Right Ear: Tympanic membrane, external ear and ear canal normal.   Left Ear: Tympanic membrane, external ear and ear canal normal.   Nose: Mucosal edema present. No rhinorrhea. Right sinus exhibits no maxillary sinus tenderness and no frontal sinus tenderness. Left sinus exhibits no maxillary sinus tenderness and no frontal sinus tenderness.   Mouth/Throat: Uvula is midline and mucous membranes are normal. Posterior oropharyngeal erythema present. No oropharyngeal exudate or posterior oropharyngeal edema. Tonsils are 2+ on the right. Tonsils are 2+ on the left. No tonsillar exudate.   Eyes: Conjunctivae and EOM are normal. Pupils are equal, round, and reactive to light.   Neck: Normal range of motion. Neck supple.   Cardiovascular: Normal rate, regular rhythm, S1 normal, S2 normal and normal heart sounds.  Exam reveals no gallop and no friction rub.    No murmur heard.  Pulmonary/Chest: Effort normal and breath sounds normal. No accessory muscle usage. No apnea, no tachypnea and no bradypnea. No respiratory distress. She has no decreased breath sounds. She has no wheezes. She has no rhonchi. She has no rales.   Lymphadenopathy:        Head (right side): No submental, no submandibular and no tonsillar adenopathy present.        Head (left side): No submental, no submandibular and no tonsillar adenopathy present.     She has no cervical adenopathy.   Neurological: She is alert and oriented to person, place, and time.   Skin: Skin is warm and dry. No rash noted. She is not diaphoretic.   Psychiatric: She has a normal mood and affect. Her speech is normal and behavior is normal.   Nursing note and vitals reviewed.      Assessment:       1. Pharyngitis, unspecified etiology    2. Sore throat    3. Sinus congestion        Plan:   Pharyngitis, unspecified etiology  -     amoxicillin (AMOXIL) 875 MG tablet; Take 1 tablet (875 mg total) by mouth 2 (two) times daily.  Dispense: 20 tablet; Refill: 0    Sore  throat  -     POCT RAPID STREP A  -     Strep A culture, throat  -     POCT Infectious Mononucleosis Antibody    Sinus congestion  -     loratadine (CLARITIN) 10 mg tablet; Take 1 tablet (10 mg total) by mouth once daily.  Dispense: 30 tablet; Refill: 0  -     fluticasone (FLONASE) 50 mcg/actuation nasal spray; 2 sprays by Each Nare route once daily.  Dispense: 16 g; Refill: 0      · Take antibiotics exactly as prescribed. Do not stop taking antibiotics sooner than instructed in order to prevent recurrence of infection and antibiotic resistance.   · Once your fever has resolved and you have been taking antibiotics for at least 24 hours, you are no longer considered contagious and may return to work or school.   · You may take Tylenol or Ibuprofen as needed for fever, throat pain, or body aches.   · For sore throat, gargling with warm salt water, throat lozenges, or chloraseptic spray may help with pain.  · Make sure to get a new toothbrush after you have been on antibiotics for 24-48 hours. Please contact your primary care provider if symptoms do not improve within 2 days or sooner for any new or worsening symptoms.  · Please go to the ER for any worsening in your condition including: hives, rash, increased pain or swelling to throat, persistent fever that does not improve with Tylenol/Motrin use, dark urine, severe headache, vision changes, neck stiffness, lethargy, or for any other new or concerning symptoms.

## 2017-09-14 NOTE — PATIENT INSTRUCTIONS

## 2017-09-17 LAB — BACTERIA THROAT CULT: NORMAL

## 2018-05-11 ENCOUNTER — HOSPITAL ENCOUNTER (OUTPATIENT)
Dept: RADIOLOGY | Facility: HOSPITAL | Age: 23
Discharge: HOME | End: 2018-05-11
Attending: NURSE PRACTITIONER
Payer: COMMERCIAL

## 2018-05-11 ENCOUNTER — OFFICE VISIT (OUTPATIENT)
Dept: URGENT CARE | Facility: CLINIC | Age: 23
End: 2018-05-11
Payer: COMMERCIAL

## 2018-05-11 VITALS
DIASTOLIC BLOOD PRESSURE: 82 MMHG | RESPIRATION RATE: 20 BRPM | OXYGEN SATURATION: 98 % | HEIGHT: 60 IN | BODY MASS INDEX: 39.9 KG/M2 | TEMPERATURE: 99 F | HEART RATE: 103 BPM | WEIGHT: 203.25 LBS | SYSTOLIC BLOOD PRESSURE: 122 MMHG

## 2018-05-11 DIAGNOSIS — R06.2 WHEEZING: ICD-10-CM

## 2018-05-11 DIAGNOSIS — R05.9 COUGH: ICD-10-CM

## 2018-05-11 DIAGNOSIS — J02.9 SORE THROAT: ICD-10-CM

## 2018-05-11 DIAGNOSIS — J40 BRONCHITIS: Primary | ICD-10-CM

## 2018-05-11 DIAGNOSIS — R09.82 PND (POST-NASAL DRIP): ICD-10-CM

## 2018-05-11 PROCEDURE — 94640 AIRWAY INHALATION TREATMENT: CPT | Mod: S$GLB,,, | Performed by: FAMILY MEDICINE

## 2018-05-11 PROCEDURE — 3008F BODY MASS INDEX DOCD: CPT | Mod: CPTII,S$GLB,, | Performed by: NURSE PRACTITIONER

## 2018-05-11 PROCEDURE — 71046 X-RAY EXAM CHEST 2 VIEWS: CPT | Mod: TC,FY,PO

## 2018-05-11 PROCEDURE — 99999 PR PBB SHADOW E&M-EST. PATIENT-LVL IV: CPT | Mod: PBBFAC,,, | Performed by: NURSE PRACTITIONER

## 2018-05-11 PROCEDURE — 71046 X-RAY EXAM CHEST 2 VIEWS: CPT | Mod: 26,,, | Performed by: RADIOLOGY

## 2018-05-11 PROCEDURE — 99214 OFFICE O/P EST MOD 30 MIN: CPT | Mod: S$GLB,,, | Performed by: NURSE PRACTITIONER

## 2018-05-11 RX ORDER — IPRATROPIUM BROMIDE AND ALBUTEROL SULFATE 2.5; .5 MG/3ML; MG/3ML
3 SOLUTION RESPIRATORY (INHALATION)
Status: COMPLETED | OUTPATIENT
Start: 2018-05-11 | End: 2018-05-11

## 2018-05-11 RX ORDER — PROMETHAZINE HYDROCHLORIDE AND DEXTROMETHORPHAN HYDROBROMIDE 6.25; 15 MG/5ML; MG/5ML
5 SYRUP ORAL NIGHTLY PRN
Qty: 118 ML | Refills: 0 | Status: SHIPPED | OUTPATIENT
Start: 2018-05-11 | End: 2018-05-21

## 2018-05-11 RX ORDER — BENZONATATE 200 MG/1
200 CAPSULE ORAL 3 TIMES DAILY PRN
Qty: 30 CAPSULE | Refills: 0 | Status: SHIPPED | OUTPATIENT
Start: 2018-05-11 | End: 2018-05-18

## 2018-05-11 RX ORDER — NYSTATIN 100000 [USP'U]/G
POWDER TOPICAL
COMMUNITY
Start: 2018-05-01 | End: 2019-06-03

## 2018-05-11 RX ORDER — ALBUTEROL SULFATE 90 UG/1
1-2 AEROSOL, METERED RESPIRATORY (INHALATION) EVERY 4 HOURS PRN
Qty: 1 INHALER | Refills: 0 | Status: SHIPPED | OUTPATIENT
Start: 2018-05-11 | End: 2019-06-03

## 2018-05-11 RX ORDER — CETIRIZINE HYDROCHLORIDE 10 MG/1
10 TABLET ORAL DAILY
Qty: 30 TABLET | Refills: 0 | COMMUNITY
Start: 2018-05-11 | End: 2019-02-21

## 2018-05-11 RX ORDER — PHENTERMINE HYDROCHLORIDE 37.5 MG/1
18.75 TABLET ORAL 2 TIMES DAILY
Refills: 0 | COMMUNITY
Start: 2018-03-06 | End: 2018-10-04

## 2018-05-11 RX ADMIN — IPRATROPIUM BROMIDE AND ALBUTEROL SULFATE 3 ML: 2.5; .5 SOLUTION RESPIRATORY (INHALATION) at 09:05

## 2018-05-11 NOTE — PROGRESS NOTES
Subjective:       Patient ID: Elida Pickard is a 22 y.o. female.    Chief Complaint: Cough (cough, congestion, chest pain x 2 days + fever)    Pt is a 22 year old female to clinic today with complaints of fever (101), cough, congestion and ST that began 3 days ago.       Cough   This is a new problem. The current episode started in the past 7 days. The problem has been gradually worsening. The problem occurs every few minutes. The cough is productive of sputum. Associated symptoms include ear congestion, a fever, myalgias, nasal congestion, postnasal drip, rhinorrhea and a sore throat. Pertinent negatives include no chest pain, chills, ear pain, headaches, heartburn, hemoptysis, rash, shortness of breath, sweats, weight loss or wheezing. Treatments tried: mucinex, nyquil. The treatment provided mild relief. Her past medical history is significant for bronchitis. There is no history of asthma or pneumonia.     Review of Systems   Constitutional: Positive for fever. Negative for chills, diaphoresis, fatigue and weight loss.   HENT: Positive for congestion, postnasal drip, rhinorrhea and sore throat. Negative for ear discharge, ear pain, sinus pain, sinus pressure, sneezing and trouble swallowing.    Eyes: Negative for pain.   Respiratory: Positive for cough. Negative for hemoptysis, chest tightness, shortness of breath and wheezing.    Cardiovascular: Negative for chest pain and palpitations.   Gastrointestinal: Negative for abdominal pain, diarrhea, heartburn, nausea and vomiting.   Genitourinary: Negative for dysuria.   Musculoskeletal: Positive for myalgias. Negative for back pain and neck pain.   Skin: Negative for rash.   Neurological: Negative for dizziness, light-headedness and headaches.       Objective:      Physical Exam   Constitutional: She is oriented to person, place, and time. She appears well-developed and well-nourished. No distress.   HENT:   Head: Normocephalic.   Right Ear: External ear and  ear canal normal. No tenderness. Tympanic membrane is not bulging. A middle ear effusion is present.   Left Ear: External ear and ear canal normal. No tenderness. Tympanic membrane is not bulging. A middle ear effusion is present.   Nose: Mucosal edema and rhinorrhea present. Right sinus exhibits no maxillary sinus tenderness and no frontal sinus tenderness. Left sinus exhibits no maxillary sinus tenderness and no frontal sinus tenderness.   Mouth/Throat: Uvula is midline, oropharynx is clear and moist and mucous membranes are normal. No oropharyngeal exudate, posterior oropharyngeal edema or posterior oropharyngeal erythema.   Eyes: Conjunctivae and EOM are normal. Pupils are equal, round, and reactive to light.   Neck: Normal range of motion. Neck supple.   Cardiovascular: Normal rate, regular rhythm, S1 normal, S2 normal and normal heart sounds.  Exam reveals no gallop and no friction rub.    No murmur heard.  Pulmonary/Chest: Effort normal. No accessory muscle usage. No apnea, no tachypnea and no bradypnea. No respiratory distress. She has no decreased breath sounds. She has wheezes in the right upper field, the right lower field, the left upper field and the left lower field. She has no rhonchi. She has no rales.   Lymphadenopathy:        Head (right side): No submental, no submandibular and no tonsillar adenopathy present.        Head (left side): No submental, no submandibular and no tonsillar adenopathy present.     She has no cervical adenopathy.   Neurological: She is alert and oriented to person, place, and time.   Skin: Skin is warm and dry. No rash noted. She is not diaphoretic.   Psychiatric: She has a normal mood and affect. Her speech is normal and behavior is normal. Thought content normal.   Nursing note and vitals reviewed.      Assessment:       1. Bronchitis    2. Cough    3. Wheezing    4. Sore throat    5. PND (post-nasal drip)        Plan:   Bronchitis  -     benzonatate (TESSALON) 200 MG  capsule; Take 1 capsule (200 mg total) by mouth 3 (three) times daily as needed for Cough.  Dispense: 30 capsule; Refill: 0  -     promethazine-dextromethorphan (PROMETHAZINE-DM) 6.25-15 mg/5 mL Syrp; Take 5 mLs by mouth nightly as needed.  Dispense: 118 mL; Refill: 0  -     albuterol 90 mcg/actuation inhaler; Inhale 1-2 puffs into the lungs every 4 (four) hours as needed for Wheezing or Shortness of Breath (cough).  Dispense: 1 Inhaler; Refill: 0    Cough  -     X-Ray Chest PA And Lateral; Future; Expected date: 05/11/2018  -     albuterol-ipratropium 2.5mg-0.5mg/3mL nebulizer solution 3 mL; Take 3 mLs by nebulization one time.  -     benzonatate (TESSALON) 200 MG capsule; Take 1 capsule (200 mg total) by mouth 3 (three) times daily as needed for Cough.  Dispense: 30 capsule; Refill: 0  -     promethazine-dextromethorphan (PROMETHAZINE-DM) 6.25-15 mg/5 mL Syrp; Take 5 mLs by mouth nightly as needed.  Dispense: 118 mL; Refill: 0    Wheezing  -     X-Ray Chest PA And Lateral; Future; Expected date: 05/11/2018  -     albuterol-ipratropium 2.5mg-0.5mg/3mL nebulizer solution 3 mL; Take 3 mLs by nebulization one time.  -     albuterol 90 mcg/actuation inhaler; Inhale 1-2 puffs into the lungs every 4 (four) hours as needed for Wheezing or Shortness of Breath (cough).  Dispense: 1 Inhaler; Refill: 0    Sore throat  -     cetirizine (ZYRTEC) 10 MG tablet; Take 1 tablet (10 mg total) by mouth once daily.  Dispense: 30 tablet; Refill: 0  -     benzonatate (TESSALON) 200 MG capsule; Take 1 capsule (200 mg total) by mouth 3 (three) times daily as needed for Cough.  Dispense: 30 capsule; Refill: 0    PND (post-nasal drip)  -     cetirizine (ZYRTEC) 10 MG tablet; Take 1 tablet (10 mg total) by mouth once daily.  Dispense: 30 tablet; Refill: 0      Discussed xray results with pt.    · Rest and increase fluids.   · May apply warm compresses as needed.   · Saline nasal spray or saline irrigation (Neti pot) to loosen nasal  congestion.  · Flonase or Nasacort to reduce inflammation in the sinus cavities.  · Do not drive, drink alcohol, or take any other sedating medications or substances while taking cough syrup.   · Follow up with your primary care provider or with ENT if not improved within a few days or sooner for any new or worsening symptoms.   · Go to the ER for any fever that does not improve with Tylenol/Ibuprofen, neck stiffness, rash, severe headache, vision changes, shortness of breath, chest pain, severe facial pain or swelling, or for any other new and concerning symptoms.

## 2018-05-11 NOTE — PATIENT INSTRUCTIONS
What Is Acute Bronchitis?  Acute bronchitis is when the airways in your lungs (bronchial tubes) become red and swollen (inflamed). It is usually caused by a viral infection. But it can also occur because of a bacteria or allergen. Symptoms include a cough that produces yellow or greenish mucus and can last for days or sometimes weeks.  Inside healthy lungs    Air travels in and out of the lungs through the airways. The linings of these airways produce sticky mucus. This mucus traps particles that enter the lungs. Tiny structures called cilia then sweep the particles out of the airways.     Healthy airway: Airways are normally open. Air moves in and out easily.      Healthy cilia: Tiny, hairlike cilia sweep mucus and particles up and out of the airways.   Lungs with bronchitis  Bronchitis often occurs with a cold or the flu virus. The airways become inflamed (red and swollen). There is a deep hacking cough from the extra mucus. Other symptoms may include:  · Wheezing  · Chest discomfort  · Shortness of breath  · Mild fever  A second infection, this time due to bacteria, may then occur. And airways irritated by allergens or smoke are more likely to get infected.        Inflamed airway: Inflammation and extra mucus narrow the airway, causing shortness of breath.      Impaired cilia: Extra mucus impairs cilia, causing congestion and wheezing. Smoking makes the problem worse.   Making a diagnosis  A physical exam, health history, and certain tests help your healthcare provider make the diagnosis.  Health history  Your healthcare provider will ask you about your symptoms.  The exam  Your provider listens to your chest for signs of congestion. He or she may also check your ears, nose, and throat.  Possible tests  · A sputum test for bacteria. This requires a sample of mucus from your lungs.  · A nasal or throat swab. This tests to see if you have a bacterial infection.  · A chest X-ray. This is done if your healthcare  provider thinks you have pneumonia.  · Tests to check for an underlying condition. Other tests may be done to check for things such as allergies, asthma, or COPD (chronic obstructive pulmonary disease). You may need to see a specialist for more lung function testing.  Treating a cough  The main treatment for bronchitis is easing symptoms. Avoiding smoke, allergens, and other things that trigger coughing can often help. If the infection is bacterial, you may be given antibiotics. During the illness, it's important to get plenty of sleep. To ease symptoms:  · Dont smoke. Also avoid secondhand smoke.  · Use a humidifier. Or try breathing in steam from a hot shower. This may help loosen mucus.  · Drink a lot of water and juice. They can soothe the throat and may help thin mucus.  · Sit up or use extra pillows when in bed. This helps to lessen coughing and congestion.  · Ask your provider about using medicine. Ask about using cough medicine, pain and fever medicine, or a decongestant.  Antibiotics  Most cases of bronchitis are caused by cold or flu viruses. They dont need antibiotics to treat them, even if your mucus is thick and green or yellow. Antibiotics dont treat viral illness and antibiotics have not been shown to have any benefit in cases of acute bronchitis. Taking antibiotics when they are not needed increases your risk of getting an infection later that is antibiotic-resistant. Antibiotics can also cause severe cases of diarrhea that require other antibiotics to treat.  It is important that you accept your healthcare provider's opinion to not use antibiotics. Your provider will prescribe antibiotics if the infection is caused by bacteria. If they are prescribed:  · Take all of the medicine. Take the medicine until it is used up, even if symptoms have improved. If you dont, the bronchitis may come back.  · Take the medicines as directed. For instance, some medicines should be taken with food.  · Ask about  side effects. Ask your provider or pharmacist what side effects are common, and what to do about them.  Follow-up care  You should see your provider again in 2 to 3 weeks. By this time, symptoms should have improved. An infection that lasts longer may mean you have a more serious problem.  Prevention  · Avoid tobacco smoke. If you smoke, quit. Stay away from smoky places. Ask friends and family not to smoke around you, or in your home or car.  · Get checked for allergies.  · Ask your provider about getting a yearly flu shot. Also ask about pneumococcal or pneumonia shots.  · Wash your hands often. This helps reduce the chance of picking up viruses that cause colds and flu.  Call your healthcare provider if:  · Symptoms worsen, or you have new symptoms  · Breathing problems worsen or  become severe  · Symptoms dont get better within a week, or within 3 days of taking antibiotics   Date Last Reviewed: 2/1/2017  © 5844-5852 [employer]. [address] [place] [address], [place] [address]. All rights reserved. This information is not intended as a substitute for professional medical care. Always follow your healthcare professional's instructions.

## 2018-10-04 ENCOUNTER — OFFICE VISIT (OUTPATIENT)
Dept: INTERNAL MEDICINE | Facility: CLINIC | Age: 23
End: 2018-10-04
Payer: COMMERCIAL

## 2018-10-04 VITALS
HEART RATE: 72 BPM | DIASTOLIC BLOOD PRESSURE: 88 MMHG | HEIGHT: 61 IN | BODY MASS INDEX: 39.3 KG/M2 | WEIGHT: 208.13 LBS | TEMPERATURE: 98 F | SYSTOLIC BLOOD PRESSURE: 118 MMHG

## 2018-10-04 DIAGNOSIS — F32.5 MAJOR DEPRESSIVE DISORDER WITH SINGLE EPISODE, IN REMISSION: Primary | ICD-10-CM

## 2018-10-04 DIAGNOSIS — Z72.0 TOBACCO ABUSE: ICD-10-CM

## 2018-10-04 PROCEDURE — 99999 PR PBB SHADOW E&M-EST. PATIENT-LVL III: CPT | Mod: PBBFAC,,, | Performed by: FAMILY MEDICINE

## 2018-10-04 PROCEDURE — 3008F BODY MASS INDEX DOCD: CPT | Mod: CPTII,S$GLB,, | Performed by: FAMILY MEDICINE

## 2018-10-04 PROCEDURE — 99213 OFFICE O/P EST LOW 20 MIN: CPT | Mod: S$GLB,,, | Performed by: FAMILY MEDICINE

## 2018-10-04 RX ORDER — ESCITALOPRAM OXALATE 10 MG/1
10 TABLET ORAL DAILY
Qty: 30 TABLET | Refills: 6 | Status: SHIPPED | OUTPATIENT
Start: 2018-10-04 | End: 2019-03-26 | Stop reason: SDUPTHER

## 2018-10-04 RX ORDER — BUPROPION HYDROCHLORIDE 150 MG/1
150 TABLET ORAL DAILY
Qty: 30 TABLET | Refills: 6 | Status: SHIPPED | OUTPATIENT
Start: 2018-10-04 | End: 2019-03-30 | Stop reason: SDUPTHER

## 2019-02-21 ENCOUNTER — OFFICE VISIT (OUTPATIENT)
Dept: URGENT CARE | Facility: CLINIC | Age: 24
End: 2019-02-21
Payer: COMMERCIAL

## 2019-02-21 VITALS
HEART RATE: 87 BPM | TEMPERATURE: 99 F | RESPIRATION RATE: 16 BRPM | BODY MASS INDEX: 42.98 KG/M2 | SYSTOLIC BLOOD PRESSURE: 124 MMHG | DIASTOLIC BLOOD PRESSURE: 76 MMHG | OXYGEN SATURATION: 98 % | WEIGHT: 223.75 LBS

## 2019-02-21 DIAGNOSIS — B96.89 BACTERIAL URI: Primary | ICD-10-CM

## 2019-02-21 DIAGNOSIS — Z20.828 EXPOSURE TO THE FLU: ICD-10-CM

## 2019-02-21 DIAGNOSIS — R59.1 LYMPHADENOPATHY: ICD-10-CM

## 2019-02-21 DIAGNOSIS — J06.9 BACTERIAL URI: Primary | ICD-10-CM

## 2019-02-21 LAB
CTP QC/QA: YES
CTP QC/QA: YES
POC MOLECULAR INFLUENZA A AGN: NEGATIVE
POC MOLECULAR INFLUENZA B AGN: NEGATIVE
S PYO RRNA THROAT QL PROBE: NEGATIVE

## 2019-02-21 PROCEDURE — 87880 POCT RAPID STREP A: ICD-10-PCS | Mod: QW,S$GLB,, | Performed by: NURSE PRACTITIONER

## 2019-02-21 PROCEDURE — 87880 STREP A ASSAY W/OPTIC: CPT | Mod: QW,S$GLB,, | Performed by: NURSE PRACTITIONER

## 2019-02-21 PROCEDURE — 99999 PR PBB SHADOW E&M-EST. PATIENT-LVL IV: ICD-10-PCS | Mod: PBBFAC,,, | Performed by: NURSE PRACTITIONER

## 2019-02-21 PROCEDURE — 3008F PR BODY MASS INDEX (BMI) DOCUMENTED: ICD-10-PCS | Mod: CPTII,S$GLB,, | Performed by: NURSE PRACTITIONER

## 2019-02-21 PROCEDURE — 87502 INFLUENZA DNA AMP PROBE: CPT | Mod: QW,S$GLB,, | Performed by: NURSE PRACTITIONER

## 2019-02-21 PROCEDURE — 87081 CULTURE SCREEN ONLY: CPT

## 2019-02-21 PROCEDURE — 99999 PR PBB SHADOW E&M-EST. PATIENT-LVL IV: CPT | Mod: PBBFAC,,, | Performed by: NURSE PRACTITIONER

## 2019-02-21 PROCEDURE — 87147 CULTURE TYPE IMMUNOLOGIC: CPT | Mod: 59

## 2019-02-21 PROCEDURE — 99214 OFFICE O/P EST MOD 30 MIN: CPT | Mod: S$GLB,,, | Performed by: NURSE PRACTITIONER

## 2019-02-21 PROCEDURE — 99214 PR OFFICE/OUTPT VISIT, EST, LEVL IV, 30-39 MIN: ICD-10-PCS | Mod: S$GLB,,, | Performed by: NURSE PRACTITIONER

## 2019-02-21 PROCEDURE — 3008F BODY MASS INDEX DOCD: CPT | Mod: CPTII,S$GLB,, | Performed by: NURSE PRACTITIONER

## 2019-02-21 PROCEDURE — 87502 POCT INFLUENZA A/B MOLECULAR: ICD-10-PCS | Mod: QW,S$GLB,, | Performed by: NURSE PRACTITIONER

## 2019-02-21 RX ORDER — AZITHROMYCIN 250 MG/1
TABLET, FILM COATED ORAL
Qty: 6 TABLET | Refills: 0 | Status: SHIPPED | OUTPATIENT
Start: 2019-02-21 | End: 2019-02-26

## 2019-02-21 NOTE — PROGRESS NOTES
Subjective:       Patient ID: Elida Pickard is a 23 y.o. female.    Chief Complaint: Cough and Nasal Congestion    URI    This is a new problem. The current episode started in the past 7 days. The problem has been unchanged. Associated symptoms include congestion, coughing and swollen glands. Pertinent negatives include no abdominal pain, dysuria, ear pain, headaches, joint pain, joint swelling, nausea, plugged ear sensation, sinus pain, sneezing, sore throat, vomiting or wheezing. She has tried nothing for the symptoms.     Review of Systems   HENT: Positive for congestion. Negative for ear pain, sinus pain, sneezing and sore throat.    Respiratory: Positive for cough. Negative for wheezing.    Gastrointestinal: Negative for abdominal pain, nausea and vomiting.   Genitourinary: Negative for dysuria.   Musculoskeletal: Negative for joint pain.   Neurological: Negative for headaches.       Objective:      Physical Exam   Constitutional: She is oriented to person, place, and time. She appears well-developed and well-nourished.   HENT:   Head: Normocephalic.   Right Ear: Tympanic membrane normal.   Left Ear: Tympanic membrane normal.   Nose: Mucosal edema and rhinorrhea present.   Mouth/Throat: Uvula is midline and mucous membranes are normal. Posterior oropharyngeal erythema present.   Cardiovascular: Normal rate and normal heart sounds.   Pulmonary/Chest: Effort normal and breath sounds normal.   Lymphadenopathy:     She has cervical adenopathy.        Right cervical: Superficial cervical adenopathy present.        Left cervical: Superficial cervical adenopathy present.   Neurological: She is alert and oriented to person, place, and time.   Nursing note and vitals reviewed.      Assessment:       1. Bacterial URI    2. Exposure to the flu    3. Lymphadenopathy        Plan:         Elida was seen today for cough and nasal congestion.    Diagnoses and all orders for this visit:    Bacterial URI  -      azithromycin (Z-JAREN) 250 MG tablet; Take 2 tablets by mouth on day 1; Take 1 tablet by mouth on days 2-5  -     phenylephrine-DM-guaifenesin (DECONEX DMX) 10- mg Tab; Take 1 tablet by mouth every 6 (six) hours as needed.    Exposure to the flu  -     POCT Influenza A/B Molecular  -     POCT Rapid Strep A    Lymphadenopathy  -     Strep A culture, throat    Advise increase p.o. fluids--at least 64 ounces of water/juice & rest  Meds:   Advise complete antibiotics  Normal saline nasal wash to irrigate sinuses and for congestion/runny nose.  Cool mist humidifier/vaporizer.  Practice good handwashing.  Mucinex for cough and chest congestion.  Tylenol or Ibuprofen for fever, headache and body aches.  Warm salt water gargles for throat comfort.  Chloraseptic spray or lozenges for throat comfort.  See PCP or go to ER if symptoms worsen or fail to improve with treatment

## 2019-02-21 NOTE — PATIENT INSTRUCTIONS
Viral Upper Respiratory Illness (Adult)  You have a viral upper respiratory illness (URI), which is another term for the common cold. This illness is contagious during the first few days. It is spread through the air by coughing and sneezing. It may also be spread by direct contact (touching the sick person and then touching your own eyes, nose, or mouth). Frequent handwashing will decrease risk of spread. Most viral illnesses go away within 7 to 10 days with rest and simple home remedies. Sometimes the illness may last for several weeks. Antibiotics will not kill a virus, and they are generally not prescribed for this condition.    Home care  · If symptoms are severe, rest at home for the first 2 to 3 days. When you resume activity, don't let yourself get too tired.  · Avoid being exposed to cigarette smoke (yours or others).  · You may use acetaminophen or ibuprofen to control pain and fever, unless another medicine was prescribed. (Note: If you have chronic liver or kidney disease, have ever had a stomach ulcer or gastrointestinal bleeding, or are taking blood-thinning medicines, talk with your healthcare provider before using these medicines.) Aspirin should never be given to anyone under 18 years of age who is ill with a viral infection or fever. It may cause severe liver or brain damage.  · Your appetite may be poor, so a light diet is fine. Avoid dehydration by drinking 6 to 8 glasses of fluids per day (water, soft drinks, juices, tea, or soup). Extra fluids will help loosen secretions in the nose and lungs.  · Over-the-counter cold medicines will not shorten the length of time youre sick, but they may be helpful for the following symptoms: cough, sore throat, and nasal and sinus congestion. (Note: Do not use decongestants if you have high blood pressure.)  Follow-up care  Follow up with your healthcare provider, or as advised.  When to seek medical advice  Call your healthcare provider right away if any  of these occur:  · Cough with lots of colored sputum (mucus)  · Severe headache; face, neck, or ear pain  · Difficulty swallowing due to throat pain  · Fever of 100.4°F (38°C)  Call 911, or get immediate medical care  Call emergency services right away if any of these occur:  · Chest pain, shortness of breath, wheezing, or difficulty breathing  · Coughing up blood  · Inability to swallow due to throat pain  Date Last Reviewed: 9/13/2015  © 7512-8730 [employer]. [address] [place] [id number]. All rights reserved. This information is not intended as a substitute for professional medical care. Always follow your healthcare professional's instructions.

## 2019-02-24 LAB — BACTERIA THROAT CULT: NORMAL

## 2019-03-26 ENCOUNTER — TELEPHONE (OUTPATIENT)
Dept: INTERNAL MEDICINE | Facility: CLINIC | Age: 24
End: 2019-03-26

## 2019-03-26 RX ORDER — ESCITALOPRAM OXALATE 10 MG/1
10 TABLET ORAL DAILY
Qty: 30 TABLET | Refills: 0 | Status: SHIPPED | OUTPATIENT
Start: 2019-03-26 | End: 2019-05-01 | Stop reason: SDUPTHER

## 2019-03-26 NOTE — TELEPHONE ENCOUNTER
----- Message from Nicolle Sandhu sent at 3/26/2019  2:56 PM CDT -----  Contact: mr [email or web address]  needs call back regarding status of lexipro refill, faxed 3/20 & 3/26..[phone]

## 2019-03-26 NOTE — TELEPHONE ENCOUNTER
Patient has not seen Dr. Noguera in over a year.  Refill is given but the patient needs an appointment for an updated physical.

## 2019-03-31 RX ORDER — BUPROPION HYDROCHLORIDE 150 MG/1
TABLET ORAL
Qty: 30 TABLET | Refills: 5 | Status: SHIPPED | OUTPATIENT
Start: 2019-03-31 | End: 2019-10-03 | Stop reason: SDUPTHER

## 2019-05-01 RX ORDER — ESCITALOPRAM OXALATE 10 MG/1
TABLET ORAL
Qty: 30 TABLET | Refills: 0 | Status: SHIPPED | OUTPATIENT
Start: 2019-05-01 | End: 2019-06-01 | Stop reason: SDUPTHER

## 2019-06-02 RX ORDER — ESCITALOPRAM OXALATE 10 MG/1
TABLET ORAL
Qty: 30 TABLET | Refills: 0 | Status: SHIPPED | OUTPATIENT
Start: 2019-06-02 | End: 2019-06-03 | Stop reason: SDUPTHER

## 2019-06-03 ENCOUNTER — OFFICE VISIT (OUTPATIENT)
Dept: INTERNAL MEDICINE | Facility: CLINIC | Age: 24
End: 2019-06-03
Payer: COMMERCIAL

## 2019-06-03 VITALS
SYSTOLIC BLOOD PRESSURE: 122 MMHG | WEIGHT: 227.94 LBS | BODY MASS INDEX: 44.75 KG/M2 | TEMPERATURE: 97 F | HEIGHT: 60 IN | DIASTOLIC BLOOD PRESSURE: 80 MMHG | HEART RATE: 78 BPM

## 2019-06-03 DIAGNOSIS — Z00.00 ROUTINE GENERAL MEDICAL EXAMINATION AT HEALTH CARE FACILITY: ICD-10-CM

## 2019-06-03 DIAGNOSIS — F32.5 MAJOR DEPRESSIVE DISORDER WITH SINGLE EPISODE, IN REMISSION: ICD-10-CM

## 2019-06-03 PROCEDURE — 99395 PR PREVENTIVE VISIT,EST,18-39: ICD-10-PCS | Mod: S$GLB,,, | Performed by: INTERNAL MEDICINE

## 2019-06-03 PROCEDURE — 99999 PR PBB SHADOW E&M-EST. PATIENT-LVL III: ICD-10-PCS | Mod: PBBFAC,,, | Performed by: INTERNAL MEDICINE

## 2019-06-03 PROCEDURE — 99999 PR PBB SHADOW E&M-EST. PATIENT-LVL III: CPT | Mod: PBBFAC,,, | Performed by: INTERNAL MEDICINE

## 2019-06-03 PROCEDURE — 99395 PREV VISIT EST AGE 18-39: CPT | Mod: S$GLB,,, | Performed by: INTERNAL MEDICINE

## 2019-06-03 RX ORDER — ESCITALOPRAM OXALATE 10 MG/1
10 TABLET ORAL DAILY
Qty: 30 TABLET | Refills: 11 | Status: SHIPPED | OUTPATIENT
Start: 2019-06-03 | End: 2021-03-11

## 2019-06-03 NOTE — PROGRESS NOTES
Subjective:       Patient ID: Elida Pickard is a 23 y.o. female.    Chief Complaint: No chief complaint on file.    HPI Patient is a 23-year-old female presenting today for updated physical exam review of chronic health issues.  She has history of depression which has been treated with the Wellbutrin and Lexapro.  She has done very well with this regimen.  She has noted no major issues.  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  She is very excited about.    She is followed by outside gynecology for her annual exams.  She states she is up-to-date on that aspect of things.  She has no acute complaints today.    Review of Systems   Constitutional: Positive for activity change. Negative for chills, fever and unexpected weight change.   HENT: Negative for hearing loss, rhinorrhea and trouble swallowing.    Eyes: Negative for photophobia, discharge and visual disturbance.   Respiratory: Negative for cough, chest tightness, shortness of breath and wheezing.    Cardiovascular: Negative for chest pain and palpitations.   Gastrointestinal: Negative for blood in stool, constipation, diarrhea, nausea and vomiting.   Endocrine: Negative for polydipsia and polyuria.   Genitourinary: Negative for difficulty urinating, dysuria, hematuria and menstrual problem.   Musculoskeletal: Negative for arthralgias, joint swelling, neck pain and neck stiffness.   Skin: Negative for rash.   Neurological: Negative for syncope, weakness, light-headedness and headaches.   Hematological: Negative for adenopathy.   Psychiatric/Behavioral: Negative for confusion and dysphoric mood. The patient is not nervous/anxious.        Objective:   /80   Pulse 78   Temp 97.2 °F (36.2 °C) (Tympanic)   Ht 5' (1.524 m)   Wt 103.4 kg (227 lb 15.3 oz)   BMI 44.52 kg/m²      Physical Exam   Constitutional: She is oriented to person, place, and time. She appears well-developed and well-nourished.   HENT:    Head: Normocephalic and atraumatic.   Eyes: Pupils are equal, round, and reactive to light. EOM are normal.   Neck: Normal range of motion. Neck supple. No thyromegaly present.   Cardiovascular: Normal rate, regular rhythm and intact distal pulses. Exam reveals no gallop and no friction rub.   No murmur heard.  Pulmonary/Chest: Breath sounds normal. She has no wheezes. She has no rales. She exhibits no tenderness.   Abdominal: Soft. Bowel sounds are normal. She exhibits no distension and no mass. There is no tenderness. There is no rebound and no guarding.   Musculoskeletal: She exhibits no edema.   Lymphadenopathy:     She has no cervical adenopathy.   Neurological: She is alert and oriented to person, place, and time. She has normal reflexes. She displays normal reflexes. No cranial nerve deficit.   Skin: Skin is warm and dry. No rash noted.   Psychiatric: She has a normal mood and affect. Her behavior is normal. Judgment normal.   Vitals reviewed.          Assessment:       1. Routine general medical examination at health care facility    2. Major depressive disorder with single episode, in remission        Plan:   No problem-specific Assessment & Plan notes found for this encounter.    Routine general medical examination at health care facility  Comments:  Focus on good health habits, low fat diet, regular exercise, seatbelt use, sunscreen use    Major depressive disorder with single episode, in remission  Comments:  continue meds, stable at this time.  Orders:  -     escitalopram oxalate (LEXAPRO) 10 MG tablet; Take 1 tablet (10 mg total) by mouth once daily.  Dispense: 30 tablet; Refill: 11          Follow up in about 1 year (around 6/3/2020), or if symptoms worsen or fail to improve.

## 2019-07-18 ENCOUNTER — PATIENT MESSAGE (OUTPATIENT)
Dept: INTERNAL MEDICINE | Facility: CLINIC | Age: 24
End: 2019-07-18

## 2019-07-18 DIAGNOSIS — R63.2 BINGE EATING: Primary | ICD-10-CM

## 2019-08-13 ENCOUNTER — PATIENT OUTREACH (OUTPATIENT)
Dept: ADMINISTRATIVE | Facility: HOSPITAL | Age: 24
End: 2019-08-13

## 2019-08-14 NOTE — PROGRESS NOTES
Return call. [facility] Patient will return. Patient in agreement and vocalize understanding.

## 2019-09-04 ENCOUNTER — OFFICE VISIT (OUTPATIENT)
Dept: PSYCHIATRY | Facility: CLINIC | Age: 24
End: 2019-09-04
Payer: COMMERCIAL

## 2019-09-04 VITALS
BODY MASS INDEX: 45.08 KG/M2 | SYSTOLIC BLOOD PRESSURE: 118 MMHG | HEART RATE: 108 BPM | WEIGHT: 230.81 LBS | DIASTOLIC BLOOD PRESSURE: 94 MMHG

## 2019-09-04 DIAGNOSIS — F33.41 MDD (MAJOR DEPRESSIVE DISORDER), RECURRENT, IN PARTIAL REMISSION: Primary | ICD-10-CM

## 2019-09-04 DIAGNOSIS — F50.81 BINGE-EATING DISORDER, SEVERE: ICD-10-CM

## 2019-09-04 PROCEDURE — 99214 OFFICE O/P EST MOD 30 MIN: CPT | Mod: S$GLB,,, | Performed by: PSYCHIATRY & NEUROLOGY

## 2019-09-04 PROCEDURE — 3008F BODY MASS INDEX DOCD: CPT | Mod: CPTII,S$GLB,, | Performed by: PSYCHIATRY & NEUROLOGY

## 2019-09-04 PROCEDURE — 99999 PR PBB SHADOW E&M-EST. PATIENT-LVL II: CPT | Mod: PBBFAC,,, | Performed by: PSYCHIATRY & NEUROLOGY

## 2019-09-04 PROCEDURE — 99214 PR OFFICE/OUTPT VISIT, EST, LEVL IV, 30-39 MIN: ICD-10-PCS | Mod: S$GLB,,, | Performed by: PSYCHIATRY & NEUROLOGY

## 2019-09-04 PROCEDURE — 99999 PR PBB SHADOW E&M-EST. PATIENT-LVL II: ICD-10-PCS | Mod: PBBFAC,,, | Performed by: PSYCHIATRY & NEUROLOGY

## 2019-09-04 PROCEDURE — 3008F PR BODY MASS INDEX (BMI) DOCUMENTED: ICD-10-PCS | Mod: CPTII,S$GLB,, | Performed by: PSYCHIATRY & NEUROLOGY

## 2019-09-04 NOTE — PROGRESS NOTES
"Outpatient Psychiatry Follow-up Visit (MD/NP)    9/4/2019    Elida Pickard, a 24 y.o. female, presenting for initial evaluation visit. Met with patient.    Reason for Encounter: Referral from Dr. Noguera. Patient complains of depression, anxiety    Interval Hx: Patient seen for follow-up, first seen in April 2017 for episode of MDD. Reports moods mostly controlled, but returns for help with recurrent binge eating a problem starting about 1 year ago. Example - full pack of oreos, 3 packages of Ramen noodles. Other food in a setting. Does this 3x/week or more. Never purges. Hides evidence. Buys food separate from the family. [facility] in [facility]. Went to a nutritionist - has tried recommended "exchange diet". Triggered more frequent binging. Secret appointment. Associated with considerable weight gain. meds continuing to take medication (wellbutrin + escitalopram) for mood. From Dr. Noguera. Working ok, tolerates ok. Gaining weight. No new health conditions. No new meds. Is off birth control. Getting  coming up in November. To  in [facility]. Will keep living there. Owns house with [employer]. Working. Graduated in May. 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     Background: 22 y/o WF referred by PCP for evaluation of low motivation, decreased pleasure/enjoyment of activities, anergia. Reports symptoms present x months, saw Dr. Noguera in February. From his note:     ...expressed some concerns about the possibility of depression... Has suffered with symptoms of depression.. never been treated for depression... her father had depression issues & he committed suicide when she was 13 years old. She states that ever since then she had had off and on issues with some bowel movement. She relates presently that she's been feeling a little depressed. She has been noting a lack of motivation. She's had a hard time getting up going to work & going to class. She doesn't have any suicidality or " homicidality. She does relate some evidence of symptoms or could correlate with hypomania. We went through a bipolar questionnaire and she is essentially answered in the affirmative to all the questions. There was no evidence of severe symptoms so ever. She describes that these manic type symptoms did not have significant impact on her life. She does describe that her recollections of her father are that he had periods of very high eyes and very low lows.    Then on follow-up in March,     At her last visit we started her on Lexapro 10 mg daily. She is here to report on the effects of the medication. She states that it seems to be working for her depression. She states that she feels like she's doing pretty well from that standpoint. Unfortunately though she is having some side effects. She notes that it makes her feel extremely sleepy and fatigued and she just doesn't feel like it's tolerated with the the side effects that she's having. We had also made referral to psychiatry. Their first appointment was April 11 so she is not seen them yet. She has not demonstrated any symptoms of hypomania or autumn so it is we will continue to work along the process here at this point.    Lexapro was discontinued sertraline started. Experienced new onset SI after started sertraline & she subsequently self-discontinued this medication & hasn't had SI since. Symptoms otherwise are ongoing largely as prior to treatment. Denies periods of prolonged euphoria or irritable moods for days or longer (max x hours). No hallucinations or delusions.     Past Psych hx: no formal psych assessments; no hx of self-harm behaviors, psych hospitalization, hallucinations, delusions.   Famhx: as above (father committed suicide when patient was 13); mother & maternal GM suffer depression  Social & medical hx reviewed    Review Of Systems:     GENERAL:  No weight gain or loss  SKIN:  No rashes or lacerations  HEAD:  No headaches  EYES:  No exophthalmos,  "jaundice or blindness  EARS:  No dizziness, tinnitus or hearing loss  NOSE:  No changes in smell  MOUTH & THROAT:  No dyskinetic movements or obvious goiter  CHEST:  No shortness of breath, hyperventilation or cough  CARDIOVASCULAR:  No tachycardia or chest pain  ABDOMEN:  No nausea, vomiting, pain, constipation or diarrhea  URINARY:  No frequency, dysuria or sexual dysfunction  ENDOCRINE:  No polydipsia, polyuria  MUSCULOSKELETAL:  No pain or stiffness of the joints  NEUROLOGIC:  No weakness, sensory changes, seizures, confusion, memory loss, tremor or other abnormal movements    Current Evaluation:     Nutritional Screening: Considering the patient's height and weight, medications, medical history and preferences, should a referral be made to the dietitian? no    Constitutional  Vitals:  Most recent vital signs, dated less than 90 days prior to this appointment, were not reviewed.    There were no vitals filed for this visit.     General:  unremarkable, age appropriate     Musculoskeletal  Muscle Strength/Tone:  no tremor, no tic   Gait & Station:  non-ataxic     Psychiatric  Appearance: casually dressed & groomed;   Behavior: calm,   Cooperation: cooperative with assessment  Speech: normal rate, volume, tone  Thought Process: linear, goal-directed  Thought Content: No suicidal or homicidal ideation; no delusions  Affect: restricted  Mood: "blah"  Perceptions: No auditory or visual hallucinations  Level of Consciousness: alert throughout interview  Insight: fair  Cognition: Oriented to person, place, time, & situation  Memory: no apparent deficits to general clinical interview; not formally assessed  Attention/Concentration: no apparent deficits to general clinical interview; not formally assessed  Fund of Knowledge: average by vocabulary/education    Laboratory Data  No visits with results within 1 Month(s) from this visit.   Latest known visit with results is:   Office Visit on 02/21/2019   Component Date Value " Ref Range Status    POC Molecular Influenza A Ag 02/21/2019 Negative  Negative, Not Reported Final    POC Molecular Influenza B Ag 02/21/2019 Negative  Negative, Not Reported Final     Acceptable 02/21/2019 Yes   Final    Rapid Strep A Screen 02/21/2019 Negative  Negative Final     Acceptable 02/21/2019 Yes   Final    Strep A Culture 02/21/2019 No  Group A  Streptococcus isolated   Final     Medications  Outpatient Encounter Medications as of 9/4/2019   Medication Sig Dispense Refill    buPROPion (WELLBUTRIN XL) 150 MG TB24 tablet TAKE 1 TABLET BY MOUTH EVERY DAY 30 tablet 5    CAMRESE 0.15 mg-30 mcg (84)/10 mcg (7) 3MPk Take 1 tablet by mouth once daily.  0    escitalopram oxalate (LEXAPRO) 10 MG tablet Take 1 tablet (10 mg total) by mouth once daily. 30 tablet 11     No facility-administered encounter medications on file as of 9/4/2019.      Assessment - Diagnosis - Goals:     Impression: 23 y/o WF with prolonged anhedonia, self-reproach, anergia, delcine in fucntioning. Also has problems with anxiety in public settings, particularly crowds. Lexapro was helpful for moods but sedating, tolerated on second trial. Sertraline may have induced SI. Binge-eating disorder current focus of treatment.     Dx: binge eating disorder; major depressive disorder    Treatment Goals:  Specify outcomes written in observable, behavioral terms:   Decrease anxiety symptoms by self-report questionnaire    Treatment Plan/Recommendations:   · Psychotherapy recommendations.   · lexapro 10 mg for maintenance mood treatment. Discussed risks, benefits, and alternatives to treatment plan documented above with patient. I answered all patient questions related to this plan and patient expressed understanding and agreement.     Return to Clinic: 6 weeks    Counseling time: 10 minutes  Total time: 25 minutes    CHLOE Wilburn MD

## 2019-10-03 RX ORDER — BUPROPION HYDROCHLORIDE 150 MG/1
TABLET ORAL
Qty: 90 TABLET | Refills: 1 | Status: SHIPPED | OUTPATIENT
Start: 2019-10-03 | End: 2021-03-11

## 2019-12-16 ENCOUNTER — PATIENT OUTREACH (OUTPATIENT)
Dept: ADMINISTRATIVE | Facility: HOSPITAL | Age: 24
End: 2019-12-16

## 2019-12-16 NOTE — PROGRESS NOTES
Patient on cervical cancer screening report. Attempted to call pt. No answer. Sent pt portal message.

## 2020-01-24 NOTE — PROGRESS NOTES
"Subjective:      Patient ID: Elida Pickard is a 23 y.o. female.    Chief Complaint: Medication Refill and trouble focusing    HPI  22 yo female here for med refill.  Having trouble with focus, feels the med makes her more tired and that is what causes lack of focus.  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.  Classes are going ok.  She wants to see if wellbutrin might be a better option as she wants to quit smoking.      History reviewed. No pertinent past medical history.  Family History   Problem Relation Age of Onset    Hypertension Paternal Grandmother     No Known Problems Mother     Depression Sister         Suicide     History reviewed. No pertinent surgical history.  Social History     Tobacco Use    Smoking status: Current Every Day Smoker     Packs/day: 0.25     Years: 7.00     Pack years: 1.75    Smokeless tobacco: Never Used   Substance Use Topics    Alcohol use: Yes     Comment: socially    Drug use: No       /88 (BP Location: Left arm, Patient Position: Sitting, BP Method: X-Large (Manual))   Pulse 72   Temp 98.2 °F (36.8 °C) (Tympanic)   Ht 5' 0.5" (1.537 m)   Wt 94.4 kg (208 lb 1.8 oz)   LMP 08/07/2018 (Approximate)   BMI 39.98 kg/m²     Review of Systems   Psychiatric/Behavioral: Positive for decreased concentration. Negative for confusion.       Objective:     Physical Exam   Constitutional: She appears well-developed and well-nourished.   Cardiovascular: Normal rate, regular rhythm and normal heart sounds.   Pulmonary/Chest: Effort normal and breath sounds normal. No stridor. No respiratory distress.   Psychiatric: She has a normal mood and affect. Her behavior is normal. Judgment and thought content normal.   Nursing note and vitals reviewed.      Lab Results   Component Value Date    WBC 10.73 02/09/2017    HGB 15.1 02/09/2017    HCT 44.4 02/09/2017     02/09/2017    ALT 34 02/09/2017    AST 24 02/09/2017     02/09/2017    K 4.1 02/09/2017     02/09/2017    CREATININE " 0.8 02/09/2017    BUN 9 02/09/2017    CO2 23 02/09/2017    TSH 1.507 02/09/2017       Assessment:     1. Major depressive disorder with single episode, in remission    2. Tobacco abuse         Plan:     Major depressive disorder with single episode, in remission    Tobacco abuse    Other orders  -     escitalopram oxalate (LEXAPRO) 10 MG tablet; Take 1 tablet (10 mg total) by mouth once daily.  Dispense: 30 tablet; Refill: 6  -     buPROPion (WELLBUTRIN XL) 150 MG TB24 tablet; Take 1 tablet (150 mg total) by mouth once daily.  Dispense: 30 tablet; Refill: 6    Will stop the lexapro once she runs out of current script, about 2 wks left.  Go ahead and start wellbutrin 150mg once daily.  Set a date to quit smoking in about 3-4 wks.  Healthy diet/exercise  F/u 3 mos for med check   Reviewed / Acceptable

## 2020-03-04 ENCOUNTER — PATIENT OUTREACH (OUTPATIENT)
Dept: ADMINISTRATIVE | Facility: HOSPITAL | Age: 25
End: 2020-03-04

## 2020-10-06 ENCOUNTER — PATIENT MESSAGE (OUTPATIENT)
Dept: ADMINISTRATIVE | Facility: HOSPITAL | Age: 25
End: 2020-10-06

## 2021-03-11 ENCOUNTER — OFFICE VISIT (OUTPATIENT)
Dept: INTERNAL MEDICINE | Facility: CLINIC | Age: 26
End: 2021-03-11
Payer: COMMERCIAL

## 2021-03-11 VITALS
DIASTOLIC BLOOD PRESSURE: 98 MMHG | BODY MASS INDEX: 43.38 KG/M2 | HEIGHT: 61 IN | TEMPERATURE: 99 F | SYSTOLIC BLOOD PRESSURE: 138 MMHG | HEART RATE: 96 BPM | WEIGHT: 229.75 LBS

## 2021-03-11 DIAGNOSIS — E88.819 INSULIN RESISTANCE: ICD-10-CM

## 2021-03-11 DIAGNOSIS — F41.9 ANXIETY: ICD-10-CM

## 2021-03-11 DIAGNOSIS — Z00.00 ROUTINE GENERAL MEDICAL EXAMINATION AT A HEALTH CARE FACILITY: Primary | ICD-10-CM

## 2021-03-11 PROCEDURE — 3008F BODY MASS INDEX DOCD: CPT | Mod: CPTII,S$GLB,, | Performed by: PHYSICIAN ASSISTANT

## 2021-03-11 PROCEDURE — 3008F PR BODY MASS INDEX (BMI) DOCUMENTED: ICD-10-PCS | Mod: CPTII,S$GLB,, | Performed by: PHYSICIAN ASSISTANT

## 2021-03-11 PROCEDURE — 99999 PR PBB SHADOW E&M-EST. PATIENT-LVL III: CPT | Mod: PBBFAC,,, | Performed by: PHYSICIAN ASSISTANT

## 2021-03-11 PROCEDURE — 1126F AMNT PAIN NOTED NONE PRSNT: CPT | Mod: S$GLB,,, | Performed by: PHYSICIAN ASSISTANT

## 2021-03-11 PROCEDURE — 1126F PR PAIN SEVERITY QUANTIFIED, NO PAIN PRESENT: ICD-10-PCS | Mod: S$GLB,,, | Performed by: PHYSICIAN ASSISTANT

## 2021-03-11 PROCEDURE — 99395 PR PREVENTIVE VISIT,EST,18-39: ICD-10-PCS | Mod: S$GLB,,, | Performed by: PHYSICIAN ASSISTANT

## 2021-03-11 PROCEDURE — 99999 PR PBB SHADOW E&M-EST. PATIENT-LVL III: ICD-10-PCS | Mod: PBBFAC,,, | Performed by: PHYSICIAN ASSISTANT

## 2021-03-11 PROCEDURE — 99395 PREV VISIT EST AGE 18-39: CPT | Mod: S$GLB,,, | Performed by: PHYSICIAN ASSISTANT

## 2021-03-11 RX ORDER — BUSPIRONE HYDROCHLORIDE 10 MG/1
10 TABLET ORAL 2 TIMES DAILY
Qty: 60 TABLET | Refills: 0 | Status: SHIPPED | OUTPATIENT
Start: 2021-03-11 | End: 2021-06-04 | Stop reason: ALTCHOICE

## 2021-06-03 ENCOUNTER — TELEPHONE (OUTPATIENT)
Dept: INTERNAL MEDICINE | Facility: CLINIC | Age: 26
End: 2021-06-03

## 2021-06-04 ENCOUNTER — LAB VISIT (OUTPATIENT)
Dept: LAB | Facility: HOSPITAL | Age: 26
End: 2021-06-04
Attending: PHYSICIAN ASSISTANT
Payer: COMMERCIAL

## 2021-06-04 ENCOUNTER — OFFICE VISIT (OUTPATIENT)
Dept: INTERNAL MEDICINE | Facility: CLINIC | Age: 26
End: 2021-06-04
Payer: COMMERCIAL

## 2021-06-04 VITALS
SYSTOLIC BLOOD PRESSURE: 138 MMHG | BODY MASS INDEX: 45.14 KG/M2 | HEART RATE: 89 BPM | HEIGHT: 60 IN | TEMPERATURE: 99 F | WEIGHT: 229.94 LBS | DIASTOLIC BLOOD PRESSURE: 76 MMHG

## 2021-06-04 DIAGNOSIS — E88.819 INSULIN RESISTANCE: ICD-10-CM

## 2021-06-04 DIAGNOSIS — Z00.00 ROUTINE GENERAL MEDICAL EXAMINATION AT A HEALTH CARE FACILITY: ICD-10-CM

## 2021-06-04 DIAGNOSIS — Z32.01 POSITIVE PREGNANCY TEST: Primary | ICD-10-CM

## 2021-06-04 DIAGNOSIS — Z32.01 POSITIVE PREGNANCY TEST: ICD-10-CM

## 2021-06-04 LAB
ALBUMIN SERPL BCP-MCNC: 4.1 G/DL (ref 3.5–5.2)
ALP SERPL-CCNC: 101 U/L (ref 55–135)
ALT SERPL W/O P-5'-P-CCNC: 14 U/L (ref 10–44)
ANION GAP SERPL CALC-SCNC: 10 MMOL/L (ref 8–16)
AST SERPL-CCNC: 15 U/L (ref 10–40)
B-HCG UR QL: POSITIVE
BASOPHILS # BLD AUTO: 0.11 K/UL (ref 0–0.2)
BASOPHILS NFR BLD: 1.2 % (ref 0–1.9)
BILIRUB SERPL-MCNC: 0.3 MG/DL (ref 0.1–1)
BUN SERPL-MCNC: 7 MG/DL (ref 6–20)
CALCIUM SERPL-MCNC: 9.8 MG/DL (ref 8.7–10.5)
CHLORIDE SERPL-SCNC: 108 MMOL/L (ref 95–110)
CHOLEST SERPL-MCNC: 176 MG/DL (ref 120–199)
CHOLEST/HDLC SERPL: 4.4 {RATIO} (ref 2–5)
CO2 SERPL-SCNC: 21 MMOL/L (ref 23–29)
CREAT SERPL-MCNC: 0.8 MG/DL (ref 0.5–1.4)
CTP QC/QA: YES
DIFFERENTIAL METHOD: NORMAL
EOSINOPHIL # BLD AUTO: 0.1 K/UL (ref 0–0.5)
EOSINOPHIL NFR BLD: 1 % (ref 0–8)
ERYTHROCYTE [DISTWIDTH] IN BLOOD BY AUTOMATED COUNT: 13.6 % (ref 11.5–14.5)
EST. GFR  (AFRICAN AMERICAN): >60 ML/MIN/1.73 M^2
EST. GFR  (NON AFRICAN AMERICAN): >60 ML/MIN/1.73 M^2
ESTIMATED AVG GLUCOSE: 82 MG/DL (ref 68–131)
GLUCOSE SERPL-MCNC: 83 MG/DL (ref 70–110)
HBA1C MFR BLD: 4.5 % (ref 4–5.6)
HCG INTACT+B SERPL-ACNC: 45 MIU/ML
HCT VFR BLD AUTO: 45.5 % (ref 37–48.5)
HDLC SERPL-MCNC: 40 MG/DL (ref 40–75)
HDLC SERPL: 22.7 % (ref 20–50)
HGB BLD-MCNC: 15 G/DL (ref 12–16)
IMM GRANULOCYTES # BLD AUTO: 0.04 K/UL (ref 0–0.04)
IMM GRANULOCYTES NFR BLD AUTO: 0.4 % (ref 0–0.5)
INSULIN COLLECTION INTERVAL: NORMAL
INSULIN SERPL-ACNC: 12 UU/ML
LDLC SERPL CALC-MCNC: 112.4 MG/DL (ref 63–159)
LYMPHOCYTES # BLD AUTO: 2 K/UL (ref 1–4.8)
LYMPHOCYTES NFR BLD: 21.6 % (ref 18–48)
MCH RBC QN AUTO: 30.5 PG (ref 27–31)
MCHC RBC AUTO-ENTMCNC: 33 G/DL (ref 32–36)
MCV RBC AUTO: 93 FL (ref 82–98)
MONOCYTES # BLD AUTO: 0.5 K/UL (ref 0.3–1)
MONOCYTES NFR BLD: 5.5 % (ref 4–15)
NEUTROPHILS # BLD AUTO: 6.3 K/UL (ref 1.8–7.7)
NEUTROPHILS NFR BLD: 70.3 % (ref 38–73)
NONHDLC SERPL-MCNC: 136 MG/DL
NRBC BLD-RTO: 0 /100 WBC
PLATELET # BLD AUTO: 270 K/UL (ref 150–450)
PMV BLD AUTO: 11.5 FL (ref 9.2–12.9)
POTASSIUM SERPL-SCNC: 4.4 MMOL/L (ref 3.5–5.1)
PROT SERPL-MCNC: 7.5 G/DL (ref 6–8.4)
RBC # BLD AUTO: 4.92 M/UL (ref 4–5.4)
SODIUM SERPL-SCNC: 139 MMOL/L (ref 136–145)
TRIGL SERPL-MCNC: 118 MG/DL (ref 30–150)
WBC # BLD AUTO: 9.03 K/UL (ref 3.9–12.7)

## 2021-06-04 PROCEDURE — 99999 PR PBB SHADOW E&M-EST. PATIENT-LVL III: ICD-10-PCS | Mod: PBBFAC,,, | Performed by: PHYSICIAN ASSISTANT

## 2021-06-04 PROCEDURE — 85025 COMPLETE CBC W/AUTO DIFF WBC: CPT | Performed by: PHYSICIAN ASSISTANT

## 2021-06-04 PROCEDURE — 83525 ASSAY OF INSULIN: CPT | Performed by: PHYSICIAN ASSISTANT

## 2021-06-04 PROCEDURE — 80061 LIPID PANEL: CPT | Performed by: PHYSICIAN ASSISTANT

## 2021-06-04 PROCEDURE — 80053 COMPREHEN METABOLIC PANEL: CPT | Performed by: PHYSICIAN ASSISTANT

## 2021-06-04 PROCEDURE — 99213 PR OFFICE/OUTPT VISIT, EST, LEVL III, 20-29 MIN: ICD-10-PCS | Mod: S$GLB,,, | Performed by: PHYSICIAN ASSISTANT

## 2021-06-04 PROCEDURE — 84702 CHORIONIC GONADOTROPIN TEST: CPT | Performed by: PHYSICIAN ASSISTANT

## 2021-06-04 PROCEDURE — 1126F PR PAIN SEVERITY QUANTIFIED, NO PAIN PRESENT: ICD-10-PCS | Mod: S$GLB,,, | Performed by: PHYSICIAN ASSISTANT

## 2021-06-04 PROCEDURE — 36415 COLL VENOUS BLD VENIPUNCTURE: CPT | Mod: PO | Performed by: PHYSICIAN ASSISTANT

## 2021-06-04 PROCEDURE — 3008F PR BODY MASS INDEX (BMI) DOCUMENTED: ICD-10-PCS | Mod: CPTII,S$GLB,, | Performed by: PHYSICIAN ASSISTANT

## 2021-06-04 PROCEDURE — 3008F BODY MASS INDEX DOCD: CPT | Mod: CPTII,S$GLB,, | Performed by: PHYSICIAN ASSISTANT

## 2021-06-04 PROCEDURE — 81025 URINE PREGNANCY TEST: CPT | Mod: S$GLB,,, | Performed by: PHYSICIAN ASSISTANT

## 2021-06-04 PROCEDURE — 99213 OFFICE O/P EST LOW 20 MIN: CPT | Mod: S$GLB,,, | Performed by: PHYSICIAN ASSISTANT

## 2021-06-04 PROCEDURE — 1126F AMNT PAIN NOTED NONE PRSNT: CPT | Mod: S$GLB,,, | Performed by: PHYSICIAN ASSISTANT

## 2021-06-04 PROCEDURE — 99999 PR PBB SHADOW E&M-EST. PATIENT-LVL III: CPT | Mod: PBBFAC,,, | Performed by: PHYSICIAN ASSISTANT

## 2021-06-04 PROCEDURE — 83036 HEMOGLOBIN GLYCOSYLATED A1C: CPT | Performed by: PHYSICIAN ASSISTANT

## 2021-06-04 PROCEDURE — 81025 POCT URINE PREGNANCY: ICD-10-PCS | Mod: S$GLB,,, | Performed by: PHYSICIAN ASSISTANT

## 2021-06-08 ENCOUNTER — PATIENT MESSAGE (OUTPATIENT)
Dept: INTERNAL MEDICINE | Facility: CLINIC | Age: 26
End: 2021-06-08

## 2021-07-29 ENCOUNTER — PATIENT OUTREACH (OUTPATIENT)
Dept: ADMINISTRATIVE | Facility: HOSPITAL | Age: 26
End: 2021-07-29

## 2022-04-27 ENCOUNTER — PATIENT MESSAGE (OUTPATIENT)
Dept: ADMINISTRATIVE | Facility: HOSPITAL | Age: 27
End: 2022-04-27
Payer: COMMERCIAL

## 2023-02-07 ENCOUNTER — OFFICE VISIT (OUTPATIENT)
Dept: INTERNAL MEDICINE | Facility: CLINIC | Age: 28
End: 2023-02-07
Payer: COMMERCIAL

## 2023-02-07 VITALS
WEIGHT: 218.69 LBS | SYSTOLIC BLOOD PRESSURE: 122 MMHG | HEIGHT: 60 IN | BODY MASS INDEX: 42.94 KG/M2 | DIASTOLIC BLOOD PRESSURE: 78 MMHG | TEMPERATURE: 98 F | HEART RATE: 60 BPM | OXYGEN SATURATION: 99 %

## 2023-02-07 DIAGNOSIS — F32.A ANXIETY AND DEPRESSION: Primary | ICD-10-CM

## 2023-02-07 DIAGNOSIS — F41.9 ANXIETY AND DEPRESSION: Primary | ICD-10-CM

## 2023-02-07 PROCEDURE — 3074F PR MOST RECENT SYSTOLIC BLOOD PRESSURE < 130 MM HG: ICD-10-PCS | Mod: CPTII,S$GLB,, | Performed by: NURSE PRACTITIONER

## 2023-02-07 PROCEDURE — 1160F RVW MEDS BY RX/DR IN RCRD: CPT | Mod: CPTII,S$GLB,, | Performed by: NURSE PRACTITIONER

## 2023-02-07 PROCEDURE — 3078F DIAST BP <80 MM HG: CPT | Mod: CPTII,S$GLB,, | Performed by: NURSE PRACTITIONER

## 2023-02-07 PROCEDURE — 3008F PR BODY MASS INDEX (BMI) DOCUMENTED: ICD-10-PCS | Mod: CPTII,S$GLB,, | Performed by: NURSE PRACTITIONER

## 2023-02-07 PROCEDURE — 3074F SYST BP LT 130 MM HG: CPT | Mod: CPTII,S$GLB,, | Performed by: NURSE PRACTITIONER

## 2023-02-07 PROCEDURE — 1159F PR MEDICATION LIST DOCUMENTED IN MEDICAL RECORD: ICD-10-PCS | Mod: CPTII,S$GLB,, | Performed by: NURSE PRACTITIONER

## 2023-02-07 PROCEDURE — 99213 OFFICE O/P EST LOW 20 MIN: CPT | Mod: S$GLB,,, | Performed by: NURSE PRACTITIONER

## 2023-02-07 PROCEDURE — 1159F MED LIST DOCD IN RCRD: CPT | Mod: CPTII,S$GLB,, | Performed by: NURSE PRACTITIONER

## 2023-02-07 PROCEDURE — 99999 PR PBB SHADOW E&M-EST. PATIENT-LVL IV: ICD-10-PCS | Mod: PBBFAC,,, | Performed by: NURSE PRACTITIONER

## 2023-02-07 PROCEDURE — 3008F BODY MASS INDEX DOCD: CPT | Mod: CPTII,S$GLB,, | Performed by: NURSE PRACTITIONER

## 2023-02-07 PROCEDURE — 99999 PR PBB SHADOW E&M-EST. PATIENT-LVL IV: CPT | Mod: PBBFAC,,, | Performed by: NURSE PRACTITIONER

## 2023-02-07 PROCEDURE — 99213 PR OFFICE/OUTPT VISIT, EST, LEVL III, 20-29 MIN: ICD-10-PCS | Mod: S$GLB,,, | Performed by: NURSE PRACTITIONER

## 2023-02-07 PROCEDURE — 1160F PR REVIEW ALL MEDS BY PRESCRIBER/CLIN PHARMACIST DOCUMENTED: ICD-10-PCS | Mod: CPTII,S$GLB,, | Performed by: NURSE PRACTITIONER

## 2023-02-07 PROCEDURE — 3078F PR MOST RECENT DIASTOLIC BLOOD PRESSURE < 80 MM HG: ICD-10-PCS | Mod: CPTII,S$GLB,, | Performed by: NURSE PRACTITIONER

## 2023-02-07 RX ORDER — FLUOXETINE HYDROCHLORIDE 20 MG/1
20 CAPSULE ORAL DAILY
COMMUNITY
End: 2023-02-07

## 2023-02-07 RX ORDER — BUSPIRONE HYDROCHLORIDE 5 MG/1
5 TABLET ORAL 2 TIMES DAILY
Qty: 60 TABLET | Refills: 1 | Status: SHIPPED | OUTPATIENT
Start: 2023-02-07 | End: 2023-03-07

## 2023-02-07 RX ORDER — FLUOXETINE HYDROCHLORIDE 40 MG/1
40 CAPSULE ORAL DAILY
Qty: 30 CAPSULE | Refills: 0 | Status: SHIPPED | OUTPATIENT
Start: 2023-02-07 | End: 2023-03-07 | Stop reason: SDUPTHER

## 2023-02-07 NOTE — PROGRESS NOTES
"Subjective:       Patient ID: Elida Ricks is a 27 y.o. female.    Chief Complaint: Anxiety    Pt presents to clinic today for depression/anxiety  Had a baby about 7 months ago and has been struggling since then  Long history of depression/anxiety, on and off meds in the past  Father killed himself when she was 13  She thinks about "bad situations" and gets overwhelmed trying to figure them out  Convincing herself that bad things are going to happen to her, her family members or her child   Zoloft- had SI on it  Lexapro- didn't feel like it helped   PRASANNA 7 score of 21  PHQ 9 score of 9  Denies SI/HI    At this point pt feels it is more anxiety than her past of depression that is holding her back  Started on Prozac by obgyn. Just started taking it 2-3 weeks ago  Doesn't feel like it is helping much       /78   Pulse 60   Temp 97.5 °F (36.4 °C)   Ht 5' (1.524 m)   Wt 99.2 kg (218 lb 11.1 oz)   LMP 01/26/2023   SpO2 99%   BMI 42.71 kg/m²     Review of Systems   Constitutional:  Negative for activity change, appetite change, chills, diaphoresis, fatigue, fever and unexpected weight change.   HENT: Negative.     Respiratory:  Negative for cough and shortness of breath.    Cardiovascular:  Negative for chest pain, palpitations and leg swelling.   Gastrointestinal: Negative.    Genitourinary: Negative.    Musculoskeletal: Negative.    Skin:  Negative for color change, pallor, rash and wound.   Allergic/Immunologic: Negative for immunocompromised state.   Neurological: Negative.  Negative for dizziness and facial asymmetry.   Hematological:  Negative for adenopathy. Does not bruise/bleed easily.   Psychiatric/Behavioral:  Positive for dysphoric mood. Negative for agitation, behavioral problems and confusion. The patient is nervous/anxious.      Objective:      Physical Exam  Vitals and nursing note reviewed.   Constitutional:       General: She is not in acute distress.     Appearance: Normal appearance. " She is well-developed. She is not diaphoretic.   HENT:      Head: Normocephalic and atraumatic.   Cardiovascular:      Rate and Rhythm: Normal rate and regular rhythm.      Heart sounds: Normal heart sounds. No murmur heard.  Pulmonary:      Effort: Pulmonary effort is normal. No respiratory distress.      Breath sounds: Normal breath sounds.   Musculoskeletal:         General: Normal range of motion.   Skin:     General: Skin is warm and dry.      Findings: No rash.   Neurological:      Mental Status: She is alert.   Psychiatric:         Mood and Affect: Mood is anxious and depressed.         Behavior: Behavior normal. Behavior is cooperative.         Thought Content: Thought content normal.         Judgment: Judgment normal.       Assessment:       1. Anxiety and depression    2. BMI 40.0-44.9, adult        Plan:       Elida was seen today for anxiety.    Diagnoses and all orders for this visit:    Anxiety and depression  -     FLUoxetine 40 MG capsule; Take 1 capsule (40 mg total) by mouth once daily.  -     busPIRone (BUSPAR) 5 MG Tab; Take 1 tablet (5 mg total) by mouth 2 (two) times daily.  -     Ambulatory referral/consult to Psychiatry; Future  - Will increase prosac to 40 mg, add buspar BID and have pt follow up in 1 month for mood check     BMI 40.0-44.9, adult      Will increase her prozac to 40 mg  Buspar 5 mg bid  1 month follow up for mood check- can be virtual   Psych referral for meds/counseling   Discussed warning signs to monitor for with increasing dose  Lean in on support system

## 2023-03-07 ENCOUNTER — OFFICE VISIT (OUTPATIENT)
Dept: INTERNAL MEDICINE | Facility: CLINIC | Age: 28
End: 2023-03-07
Payer: COMMERCIAL

## 2023-03-07 DIAGNOSIS — F32.5 MAJOR DEPRESSIVE DISORDER WITH SINGLE EPISODE, IN REMISSION: Primary | ICD-10-CM

## 2023-03-07 PROCEDURE — 1160F PR REVIEW ALL MEDS BY PRESCRIBER/CLIN PHARMACIST DOCUMENTED: ICD-10-PCS | Mod: CPTII,95,, | Performed by: NURSE PRACTITIONER

## 2023-03-07 PROCEDURE — 1160F RVW MEDS BY RX/DR IN RCRD: CPT | Mod: CPTII,95,, | Performed by: NURSE PRACTITIONER

## 2023-03-07 PROCEDURE — 1159F PR MEDICATION LIST DOCUMENTED IN MEDICAL RECORD: ICD-10-PCS | Mod: CPTII,95,, | Performed by: NURSE PRACTITIONER

## 2023-03-07 PROCEDURE — 99213 PR OFFICE/OUTPT VISIT, EST, LEVL III, 20-29 MIN: ICD-10-PCS | Mod: 95,,, | Performed by: NURSE PRACTITIONER

## 2023-03-07 PROCEDURE — 99213 OFFICE O/P EST LOW 20 MIN: CPT | Mod: 95,,, | Performed by: NURSE PRACTITIONER

## 2023-03-07 PROCEDURE — 1159F MED LIST DOCD IN RCRD: CPT | Mod: CPTII,95,, | Performed by: NURSE PRACTITIONER

## 2023-03-07 RX ORDER — BUSPIRONE HYDROCHLORIDE 10 MG/1
10 TABLET ORAL 3 TIMES DAILY
Qty: 90 TABLET | Refills: 0 | Status: SHIPPED | OUTPATIENT
Start: 2023-03-07 | End: 2023-04-03

## 2023-03-07 RX ORDER — FLUOXETINE HYDROCHLORIDE 40 MG/1
40 CAPSULE ORAL DAILY
Qty: 30 CAPSULE | Refills: 1 | Status: SHIPPED | OUTPATIENT
Start: 2023-03-07 | End: 2023-04-13 | Stop reason: SDUPTHER

## 2023-03-07 NOTE — PROGRESS NOTES
"Subjective:       Patient ID: Elida Ricks is a 27 y.o. female.    Chief Complaint: Anxiety    The patient location is: home  The chief complaint leading to consultation is: anxiety    Visit type: audiovisual    Face to Face time with patient: 15  20 minutes of total time spent on the encounter, which includes face to face time and non-face to face time preparing to see the patient (eg, review of tests), Obtaining and/or reviewing separately obtained history, Documenting clinical information in the electronic or other health record, Independently interpreting results (not separately reported) and communicating results to the patient/family/caregiver, or Care coordination (not separately reported).     Each patient to whom he or she provides medical services by telemedicine is:  (1) informed of the relationship between the physician and patient and the respective role of any other health care provider with respect to management of the patient; and (2) notified that he or she may decline to receive medical services by telemedicine and may withdraw from such care at any time.    Notes:   Pt seen virtual today for depression/anxiety follow up  Had a baby about 7 months ago and has been struggling since then  Long history of depression/anxiety, on and off meds in the past  Father killed himself when she was 13  She thinks about "bad situations" and gets overwhelmed trying to figure them out  Convincing herself that bad things are going to happen to her, her family members or her child   Zoloft- had SI on it  Lexapro- didn't feel like it helped   Denies SI/HI  Does feel like the fluoxetine is helping some  Feels like her anxiety has overall decreased  Still having some issues with anxiety but has some family issues as well  This past weekend, pt reports her brother made threats to hurt himself  He is currently in a mental hospital  Pt does feel like the Buspar helps- would like to maybe increase the dose   Has psych " appt next month       LMP 01/26/2023     Review of Systems   Constitutional:  Negative for activity change and unexpected weight change.   HENT:  Negative for hearing loss, rhinorrhea and trouble swallowing.    Eyes:  Negative for discharge and visual disturbance.   Respiratory:  Negative for chest tightness and wheezing.    Cardiovascular:  Negative for chest pain and palpitations.   Gastrointestinal:  Negative for blood in stool, constipation, diarrhea and vomiting.   Endocrine: Negative for polydipsia and polyuria.   Genitourinary:  Negative for difficulty urinating, dysuria, hematuria and menstrual problem.   Musculoskeletal:  Negative for arthralgias, joint swelling and neck pain.   Neurological:  Negative for weakness and headaches.   Psychiatric/Behavioral:  Negative for confusion and dysphoric mood. The patient is nervous/anxious.      Objective:      Physical Exam  Constitutional:       General: She is not in acute distress.     Appearance: Normal appearance. She is well-developed. She is not diaphoretic.   HENT:      Head: Normocephalic and atraumatic.      Right Ear: External ear normal.      Left Ear: External ear normal.   Eyes:      General: No scleral icterus.        Right eye: No discharge.         Left eye: No discharge.      Conjunctiva/sclera: Conjunctivae normal.   Pulmonary:      Effort: Pulmonary effort is normal. No tachypnea, accessory muscle usage or respiratory distress.      Breath sounds: No stridor.   Musculoskeletal:      Cervical back: Normal range of motion and neck supple.   Skin:     Findings: No rash.   Neurological:      Mental Status: She is alert. She is not disoriented.   Psychiatric:         Attention and Perception: She is attentive.         Mood and Affect: Mood normal. Mood is not anxious or depressed. Affect is not labile, blunt, angry or inappropriate.         Speech: Speech normal.         Behavior: Behavior normal.         Thought Content: Thought content normal.          Judgment: Judgment normal.       Assessment:       1. Major depressive disorder with single episode, in remission        Plan:       Elida was seen today for anxiety.    Diagnoses and all orders for this visit:    Major depressive disorder with single episode, in remission  -     busPIRone (BUSPAR) 10 MG tablet; Take 1 tablet (10 mg total) by mouth 3 (three) times daily.  -     FLUoxetine 40 MG capsule; Take 1 capsule (40 mg total) by mouth once daily.      Will increase buspar to 10 mg TID prn  Continue fluoxetine daily  Keep psych appt next month  6 month follow up with Dr. Noguera and PRN

## 2023-04-12 NOTE — PROGRESS NOTES
PSYCHIATRIC EVALUATION     Disclaimer: Evaluation and treatment is based on information presented to date. Any new information may affect assessment and findings.     Name: Elida Ricks  Age: 27 y.o.  : 1995    Preferred Name: Elida    Referring provider: Juliette Cook NP    Chief Complaint:  Anxiety and ADHD    History of Present Illness:   Pt was previously seen in psychiatry in 2018 by Dr. Salcedo and was prescribed Lexapro and Wellbutrin for depression. She reports feeling sad her whole life but wasn't formally diagnosed with depression until 1189-1606. Pt's current symptoms have been ongoing since the birth of her second son last year and has been taking Prozac 40 mg qd and Buspar 10 mg tid since that time. She currently reports sad and irritable mood, frequent crying, fatigue, and passive SI (no active SI, no plan, and no intent). She is experiencing ongoing anxiety with worry/rumination, muscle tension, impaired concentration, intrusive, obsessive thoughts about how she should be better/perfect at her job and as a mother. She experiences social and performance anxiety (pressures herself to constantly be productive). She reports that her sleep recently improved and currently has mild insomnia due to anxiety and reports that her appetite is normal. Pt has a significant family history of depression; her father committed suicide when pt was 13 years old. She has fears of ending up like her father and adversely affecting her children, though she knows on an intellectual level that she would never leave them intentionally. Pt does not report past or present symptoms consistent with a manic/hypomanic episode. She has never experienced psychotic symptoms. Pt has no past psychiatric hospitalizations and no past suicide attempts.    Pt was diagnosed with ADHD in 2019 and was prescribed Adderall for a period of time, which she found helpful in managing her symptoms. She reports that prior to her  diagnosis, she struggled through high school and college. She currently complains of being restless/driven and inattentive; she is having difficulty listening and completing tasks she initiates.     Pt has been  for 4 years, though she has been together with her  for the past 10 years, and they have 2 sons (ages 9 months and 2 years). 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 She previously worked as a . Pt describes having good social support despite her  not being helpful in sharing parenting and household responsibilities. She is close with her mother who lives nearby, and has close friends and other family members.      ADHD: on the go/driven, inattentive, not listening, no follow-through   Depressive Disorder: depressed mood, irritable mood, tired/fatigued, concentration problems, passive suicidal ideation, tearfulness/crying   Anxiety Disorder: anxiety/nervousness, obsessions, fatigue, irritability, muscle tension, concentration problems, excessive worry, performance anxiety   Panic Disorder: denied   Manic Disorder: denied   Psychotic Disorder: denied   Substance Use:  Alcohol: Occasionally      Review of Systems   Constitutional:  Positive for fatigue. Negative for activity change, appetite change and unexpected weight change.   Respiratory:  Negative for shortness of breath.    Psychiatric/Behavioral:  Positive for decreased concentration and dysphoric mood. Negative for agitation, behavioral problems, confusion, hallucinations, self-injury, sleep disturbance and suicidal ideas. The patient is nervous/anxious. The patient is not hyperactive.       Nutritional Screening: Considering the patient's height and weight, medications, medical history and preferences, should a referral be made to the dietitian? no    Constitutional:  Vitals:  Most  "recent vital signs were reviewed.   Last 3 sets of Vitals    Vitals - 1 value per visit 2/7/2023 2/7/2023 4/13/2023   SYSTOLIC - 122 139   DIASTOLIC - 78 83   Pulse - 60 74   Temp - 97.5 -   Resp - - -   SPO2 - 99 -   Weight (lb) - 218.7 217.81   Weight (kg) - 99.2 98.8   Height - 60 -   BMI (Calculated) - 42.7 -   VISIT REPORT - - -   Pain Score  0 - -   Some encounter information is confidential and restricted. Go to Review Flowsheets activity to see all data.          Psychiatric:  Oriented: x 3 / including: Date: 4/13/23; and aware meeting with [facility] [place]  Attitude: cooperative   Eye Contact: good   Behavior: wnl   Mood: "overwhelmed"  Affect: appropriate range   Attention: intact   Concentration: grossly intact   Thought Process: goal directed   Speech: intelligible  Volume: WNL   Quantity: WNL   Rhythm: WNL  Insight: fair to good   Threats: no SI / HI   Memory: Grossly intact  Psychosis: denies all   Estimate of Intellectual Function: average   Judgment (to simple situation): fair to good   Relevant Elements of Neurological Exam: normal gait     Medical history:   Past Medical History:   Diagnosis Date    Anxiety     Depression         Family History:  Family History   Problem Relation Age of Onset    Hypertension Paternal Grandmother     No Known Problems Mother     Depression Sister         Suicide        Family history of psychiatric illness: Father: Alcoholism, depression, suicide; Brother: Alcoholism and depression.    PSYCHO-SOCIAL DEVELOPMENT HISTORY:   Social History     Socioeconomic History    Marital status:     Number of children: 0   Occupational History    Occupation: Host     Comment: [employer]   Tobacco Use    Smoking status: Former    Smokeless tobacco: Never   Substance and Sexual Activity    Alcohol use: Yes     Comment: socially    Drug use: No    Sexual activity: Yes     Partners: Male     Birth control/protection: OCP     Social Determinants of Health     Financial " Resource Strain: Low Risk     Difficulty of Paying Living Expenses: Not very hard   Food Insecurity: No Food Insecurity    Worried About Running Out of Food in the Last Year: Never true    Ran Out of Food in the Last Year: Never true   Transportation Needs: No Transportation Needs    Lack of Transportation (Medical): No    Lack of Transportation (Non-Medical): No   Physical Activity: Unknown    Days of Exercise per Week: 0 days   Stress: Stress Concern Present    Feeling of Stress : Very much   Social Connections: Unknown    Frequency of Communication with Friends and Family: More than three times a week    Frequency of Social Gatherings with Friends and Family: Once a week    Active Member of Clubs or Organizations: No    Marital Status:    Housing Stability: Unknown    Unable to Pay for Housing in the Last Year: No    Unstable Housing in the Last Year: No        Allergy Review:   Review of patient's allergies indicates:  No Known Allergies     Medical Problem List:   Patient Active Problem List   Diagnosis    Major depressive disorder with single episode, in remission        Encounter Diagnoses   Name Primary?    Anxiety and depression     Major depressive disorder, recurrent episode, moderate     ADHD (attention deficit hyperactivity disorder), combined type Yes    PRASANNA (generalized anxiety disorder)         IMPRESSIONS/PLAN:  Pt meets diagnostic criteria for Major Depressive Disorder, recurrent, moderate, Generalized Anxiety Disorder, and Attention Deficit Hyperactivity Disorder (per history).    Medication Management: Continue current medications. Discussed risks, benefits, and alternatives to treatment plan documented above with patient. I answered all patient questions related to this plan, and patient expressed understanding and agreement.      Follow up in about 4 weeks (around 5/11/2023) for Medication follow up.     Medication List with Changes/Refills   New Medications     DEXTROAMPHETAMINE-AMPHETAMINE (ADDERALL XR) 10 MG 24 HR CAPSULE    Take 1 capsule (10 mg total) by mouth every morning.   Current Medications    L NORGEST/E.ESTRADIOL-E.ESTRAD (CAMRESE) 0.15 MG-30 MCG (84)/10 MCG (7) 3MPK    Take 1 tablet by mouth once daily.   Changed and/or Refilled Medications    Modified Medication Previous Medication    BUSPIRONE (BUSPAR) 10 MG TABLET busPIRone (BUSPAR) 10 MG tablet       Take 1 tablet (10 mg total) by mouth 2 (two) times daily.    TAKE 1 TABLET BY MOUTH THREE TIMES A DAY    FLUOXETINE 40 MG CAPSULE FLUoxetine 40 MG capsule       Take 1 capsule (40 mg total) by mouth once daily.    Take 1 capsule (40 mg total) by mouth once daily.        Time spent with pt including note preparation: 70 minutes     Yadi Frank, PhD, MP  Medical Psychologist

## 2023-04-13 ENCOUNTER — OFFICE VISIT (OUTPATIENT)
Dept: PSYCHIATRY | Facility: CLINIC | Age: 28
End: 2023-04-13
Payer: COMMERCIAL

## 2023-04-13 VITALS
DIASTOLIC BLOOD PRESSURE: 83 MMHG | SYSTOLIC BLOOD PRESSURE: 139 MMHG | WEIGHT: 217.81 LBS | HEART RATE: 74 BPM | BODY MASS INDEX: 42.54 KG/M2

## 2023-04-13 DIAGNOSIS — F41.9 ANXIETY AND DEPRESSION: ICD-10-CM

## 2023-04-13 DIAGNOSIS — F32.A ANXIETY AND DEPRESSION: ICD-10-CM

## 2023-04-13 DIAGNOSIS — F90.2 ADHD (ATTENTION DEFICIT HYPERACTIVITY DISORDER), COMBINED TYPE: Primary | ICD-10-CM

## 2023-04-13 DIAGNOSIS — F33.1 MAJOR DEPRESSIVE DISORDER, RECURRENT EPISODE, MODERATE: ICD-10-CM

## 2023-04-13 DIAGNOSIS — F41.1 GAD (GENERALIZED ANXIETY DISORDER): ICD-10-CM

## 2023-04-13 PROCEDURE — 3079F DIAST BP 80-89 MM HG: CPT | Mod: CPTII,S$GLB,, | Performed by: PSYCHOLOGIST

## 2023-04-13 PROCEDURE — 3075F SYST BP GE 130 - 139MM HG: CPT | Mod: CPTII,S$GLB,, | Performed by: PSYCHOLOGIST

## 2023-04-13 PROCEDURE — 3008F PR BODY MASS INDEX (BMI) DOCUMENTED: ICD-10-PCS | Mod: CPTII,S$GLB,, | Performed by: PSYCHOLOGIST

## 2023-04-13 PROCEDURE — 3075F PR MOST RECENT SYSTOLIC BLOOD PRESS GE 130-139MM HG: ICD-10-PCS | Mod: CPTII,S$GLB,, | Performed by: PSYCHOLOGIST

## 2023-04-13 PROCEDURE — 3079F PR MOST RECENT DIASTOLIC BLOOD PRESSURE 80-89 MM HG: ICD-10-PCS | Mod: CPTII,S$GLB,, | Performed by: PSYCHOLOGIST

## 2023-04-13 PROCEDURE — 99999 PR PBB SHADOW E&M-EST. PATIENT-LVL III: CPT | Mod: PBBFAC,,, | Performed by: PSYCHOLOGIST

## 2023-04-13 PROCEDURE — 99205 PR OFFICE/OUTPT VISIT, NEW, LEVL V, 60-74 MIN: ICD-10-PCS | Mod: S$GLB,,, | Performed by: PSYCHOLOGIST

## 2023-04-13 PROCEDURE — 3008F BODY MASS INDEX DOCD: CPT | Mod: CPTII,S$GLB,, | Performed by: PSYCHOLOGIST

## 2023-04-13 PROCEDURE — 1159F PR MEDICATION LIST DOCUMENTED IN MEDICAL RECORD: ICD-10-PCS | Mod: CPTII,S$GLB,, | Performed by: PSYCHOLOGIST

## 2023-04-13 PROCEDURE — 1159F MED LIST DOCD IN RCRD: CPT | Mod: CPTII,S$GLB,, | Performed by: PSYCHOLOGIST

## 2023-04-13 PROCEDURE — 99999 PR PBB SHADOW E&M-EST. PATIENT-LVL III: ICD-10-PCS | Mod: PBBFAC,,, | Performed by: PSYCHOLOGIST

## 2023-04-13 PROCEDURE — 99205 OFFICE O/P NEW HI 60 MIN: CPT | Mod: S$GLB,,, | Performed by: PSYCHOLOGIST

## 2023-04-13 RX ORDER — DEXTROAMPHETAMINE SACCHARATE, AMPHETAMINE ASPARTATE MONOHYDRATE, DEXTROAMPHETAMINE SULFATE AND AMPHETAMINE SULFATE 2.5; 2.5; 2.5; 2.5 MG/1; MG/1; MG/1; MG/1
10 CAPSULE, EXTENDED RELEASE ORAL EVERY MORNING
Qty: 30 CAPSULE | Refills: 0 | Status: SHIPPED | OUTPATIENT
Start: 2023-04-13 | End: 2023-05-08 | Stop reason: SDUPTHER

## 2023-04-13 RX ORDER — FLUOXETINE HYDROCHLORIDE 40 MG/1
40 CAPSULE ORAL DAILY
Qty: 30 CAPSULE | Refills: 2 | Status: SHIPPED | OUTPATIENT
Start: 2023-04-13 | End: 2023-05-26 | Stop reason: SDUPTHER

## 2023-04-13 RX ORDER — BUSPIRONE HYDROCHLORIDE 10 MG/1
10 TABLET ORAL 2 TIMES DAILY
Qty: 60 TABLET | Refills: 2 | Status: SHIPPED | OUTPATIENT
Start: 2023-04-13 | End: 2023-05-26 | Stop reason: SDUPTHER

## 2023-04-17 ENCOUNTER — PATIENT MESSAGE (OUTPATIENT)
Dept: PSYCHIATRY | Facility: CLINIC | Age: 28
End: 2023-04-17
Payer: COMMERCIAL

## 2023-04-27 ENCOUNTER — PATIENT MESSAGE (OUTPATIENT)
Dept: PSYCHIATRY | Facility: CLINIC | Age: 28
End: 2023-04-27
Payer: COMMERCIAL

## 2023-05-08 ENCOUNTER — PATIENT MESSAGE (OUTPATIENT)
Dept: PSYCHIATRY | Facility: CLINIC | Age: 28
End: 2023-05-08
Payer: COMMERCIAL

## 2023-05-08 DIAGNOSIS — F90.2 ADHD (ATTENTION DEFICIT HYPERACTIVITY DISORDER), COMBINED TYPE: ICD-10-CM

## 2023-05-08 RX ORDER — DEXTROAMPHETAMINE SACCHARATE, AMPHETAMINE ASPARTATE MONOHYDRATE, DEXTROAMPHETAMINE SULFATE AND AMPHETAMINE SULFATE 2.5; 2.5; 2.5; 2.5 MG/1; MG/1; MG/1; MG/1
10 CAPSULE, EXTENDED RELEASE ORAL EVERY MORNING
Qty: 30 CAPSULE | Refills: 0 | Status: SHIPPED | OUTPATIENT
Start: 2023-05-08 | End: 2023-05-26

## 2023-05-25 NOTE — PROGRESS NOTES
"MEDICATION MANAGEMENT SESSION    Name: Elida Ricks  Age: 27 y.o.  : 1995    Preferred Name: Elida    Referring provider: Juliette Cook NP    Reason for Visit:  Medication follow up     Summary of Visit:   Pt reports she did well with Adderall XR 10 mg qd since last visit and states it is helpful for all of her ADHD symptoms, but that its effects last only a few hours, and she again becomes distracted, unfocused, and tired. She has noticed she no longer avoids effortful tasks and has been able to pace herself throughout the day, then rest at the end of the day. Pt also reports improvement in her mood ("now so much better, not depressed"), with less emotional reactivity and interpersonal sensitivity, especially with her . She has not had a panic episode since last visit. Discussed with pt adjusting the dose of Adderall XR to address her current symptoms and reviewed associated benefits and risks.    Current Symptoms:   ADHD: inattentive, easily distracted   Depressive Disorder: tired/fatigued   Anxiety Disorder: anxiety/nervousness, fatigue, concentration problems, excessive worry   Panic Disorder: denied   Manic Disorder: denied   Psychotic Disorder: denied   Substance Use:  Alcohol: Occasionally      Review of Systems   Constitutional:  Positive for fatigue. Negative for activity change, appetite change and unexpected weight change.   Respiratory:  Negative for shortness of breath.    Psychiatric/Behavioral:  Positive for decreased concentration. Negative for agitation, behavioral problems, confusion, dysphoric mood, hallucinations, self-injury, sleep disturbance and suicidal ideas. The patient is nervous/anxious. The patient is not hyperactive.       Constitutional:  Vitals:  Most recent vital signs were reviewed.   Last 3 sets of Vitals    Vitals - 1 value per visit 2023   SYSTOLIC - 122 139   DIASTOLIC - 78 83   Pulse - 60 74   Temp - 97.5 -   Resp - - -   SPO2 - 99 - " "  Weight (lb) - 218.7 217.81   Weight (kg) - 99.2 98.8   Height - 60 -   BMI (Calculated) - 42.7 -   VISIT REPORT - - -   Pain Score  0 - -   Some encounter information is confidential and restricted. Go to Review Flowsheets activity to see all data.          Psychiatric:  Oriented: x 3   Attitude: cooperative   Eye Contact: good   Behavior: wnl   Mood: "better"  Affect: appropriate range   Attention: intact   Concentration: grossly intact   Thought Process: goal directed   Speech: intelligible  Volume: WNL   Quantity: WNL   Rhythm: WNL  Insight: good   Threats: no SI / HI   Memory: Grossly intact  Psychosis: denies all   Estimate of Intellectual Function: average   Judgment:  good  Relevant Elements of Neurological Exam: normal gait     Allergy Review:   Review of patient's allergies indicates:  No Known Allergies     Medical Problem List:   Patient Active Problem List   Diagnosis    Major depressive disorder with single episode, in remission      Encounter Diagnoses   Name Primary?    Anxiety and depression     Major depressive disorder, recurrent episode, moderate     ADHD (attention deficit hyperactivity disorder), combined type     PRASANNA (generalized anxiety disorder) Yes      PLAN:  Medication Management: Continue current medications. Discussed risks, benefits, and alternatives to treatment plan documented above with patient. I answered all patient questions related to this plan, and patient expressed understanding and agreement.      Follow up in about 2 months (around 7/26/2023) for Medication follow up.     Medication List with Changes/Refills   New Medications    FLUOXETINE 20 MG CAPSULE    Take 1 capsule (20 mg total) by mouth once daily.   Current Medications    NORGESTIMATE-ETHINYL ESTRADIOL (ORTHO-CYCLEN) 0.25-35 MG-MCG PER TABLET    Take 1 tablet by mouth once daily.   Changed and/or Refilled Medications    Modified Medication Previous Medication    BUSPIRONE (BUSPAR) 10 MG TABLET busPIRone (BUSPAR) 10 " MG tablet       Take 1 tablet (10 mg total) by mouth 2 (two) times daily.    Take 1 tablet (10 mg total) by mouth 2 (two) times daily.    DEXTROAMPHETAMINE-AMPHETAMINE (ADDERALL XR) 20 MG 24 HR CAPSULE dextroamphetamine-amphetamine (ADDERALL XR) 10 MG 24 hr capsule       Take 1 capsule (20 mg total) by mouth every morning.    Take 1 capsule (10 mg total) by mouth every morning.    FLUOXETINE 40 MG CAPSULE FLUoxetine 40 MG capsule       Take 1 capsule (40 mg total) by mouth once daily.    Take 1 capsule (40 mg total) by mouth once daily.      Time spent with pt including note preparation: 40 minutes     Yadi Frank, PhD, MP  Medical Psychologist

## 2023-05-26 ENCOUNTER — OFFICE VISIT (OUTPATIENT)
Dept: PSYCHIATRY | Facility: CLINIC | Age: 28
End: 2023-05-26
Payer: COMMERCIAL

## 2023-05-26 DIAGNOSIS — F41.9 ANXIETY AND DEPRESSION: ICD-10-CM

## 2023-05-26 DIAGNOSIS — F90.2 ADHD (ATTENTION DEFICIT HYPERACTIVITY DISORDER), COMBINED TYPE: ICD-10-CM

## 2023-05-26 DIAGNOSIS — F32.A ANXIETY AND DEPRESSION: ICD-10-CM

## 2023-05-26 DIAGNOSIS — F33.1 MAJOR DEPRESSIVE DISORDER, RECURRENT EPISODE, MODERATE: ICD-10-CM

## 2023-05-26 DIAGNOSIS — F41.1 GAD (GENERALIZED ANXIETY DISORDER): Primary | ICD-10-CM

## 2023-05-26 PROCEDURE — 99999 PR PBB SHADOW E&M-EST. PATIENT-LVL I: CPT | Mod: PBBFAC,,, | Performed by: PSYCHOLOGIST

## 2023-05-26 PROCEDURE — 99215 OFFICE O/P EST HI 40 MIN: CPT | Mod: S$GLB,,, | Performed by: PSYCHOLOGIST

## 2023-05-26 PROCEDURE — 99215 PR OFFICE/OUTPT VISIT, EST, LEVL V, 40-54 MIN: ICD-10-PCS | Mod: S$GLB,,, | Performed by: PSYCHOLOGIST

## 2023-05-26 PROCEDURE — 1159F PR MEDICATION LIST DOCUMENTED IN MEDICAL RECORD: ICD-10-PCS | Mod: CPTII,S$GLB,, | Performed by: PSYCHOLOGIST

## 2023-05-26 PROCEDURE — 99999 PR PBB SHADOW E&M-EST. PATIENT-LVL I: ICD-10-PCS | Mod: PBBFAC,,, | Performed by: PSYCHOLOGIST

## 2023-05-26 PROCEDURE — 1159F MED LIST DOCD IN RCRD: CPT | Mod: CPTII,S$GLB,, | Performed by: PSYCHOLOGIST

## 2023-05-26 RX ORDER — FLUOXETINE HYDROCHLORIDE 40 MG/1
40 CAPSULE ORAL DAILY
Qty: 30 CAPSULE | Refills: 2 | Status: SHIPPED | OUTPATIENT
Start: 2023-05-26 | End: 2023-07-24 | Stop reason: SDUPTHER

## 2023-05-26 RX ORDER — DEXTROAMPHETAMINE SACCHARATE, AMPHETAMINE ASPARTATE MONOHYDRATE, DEXTROAMPHETAMINE SULFATE AND AMPHETAMINE SULFATE 5; 5; 5; 5 MG/1; MG/1; MG/1; MG/1
20 CAPSULE, EXTENDED RELEASE ORAL EVERY MORNING
Qty: 30 CAPSULE | Refills: 0 | Status: SHIPPED | OUTPATIENT
Start: 2023-05-26 | End: 2023-06-19 | Stop reason: SDUPTHER

## 2023-05-26 RX ORDER — FLUOXETINE HYDROCHLORIDE 20 MG/1
20 CAPSULE ORAL DAILY
Qty: 30 CAPSULE | Refills: 2 | Status: SHIPPED | OUTPATIENT
Start: 2023-05-26 | End: 2023-07-24 | Stop reason: SDUPTHER

## 2023-05-26 RX ORDER — BUSPIRONE HYDROCHLORIDE 10 MG/1
10 TABLET ORAL 2 TIMES DAILY
Qty: 60 TABLET | Refills: 2 | Status: SHIPPED | OUTPATIENT
Start: 2023-05-26 | End: 2023-07-24

## 2023-06-19 DIAGNOSIS — F90.2 ADHD (ATTENTION DEFICIT HYPERACTIVITY DISORDER), COMBINED TYPE: ICD-10-CM

## 2023-06-19 RX ORDER — DEXTROAMPHETAMINE SACCHARATE, AMPHETAMINE ASPARTATE MONOHYDRATE, DEXTROAMPHETAMINE SULFATE AND AMPHETAMINE SULFATE 5; 5; 5; 5 MG/1; MG/1; MG/1; MG/1
20 CAPSULE, EXTENDED RELEASE ORAL EVERY MORNING
Qty: 30 CAPSULE | Refills: 0 | Status: SHIPPED | OUTPATIENT
Start: 2023-06-19 | End: 2023-07-16 | Stop reason: SDUPTHER

## 2023-07-16 DIAGNOSIS — F90.2 ADHD (ATTENTION DEFICIT HYPERACTIVITY DISORDER), COMBINED TYPE: ICD-10-CM

## 2023-07-17 RX ORDER — DEXTROAMPHETAMINE SACCHARATE, AMPHETAMINE ASPARTATE MONOHYDRATE, DEXTROAMPHETAMINE SULFATE AND AMPHETAMINE SULFATE 5; 5; 5; 5 MG/1; MG/1; MG/1; MG/1
20 CAPSULE, EXTENDED RELEASE ORAL EVERY MORNING
Qty: 30 CAPSULE | Refills: 0 | Status: SHIPPED | OUTPATIENT
Start: 2023-07-17 | End: 2023-07-24

## 2023-07-24 ENCOUNTER — OFFICE VISIT (OUTPATIENT)
Dept: PSYCHIATRY | Facility: CLINIC | Age: 28
End: 2023-07-24
Payer: COMMERCIAL

## 2023-07-24 VITALS — HEART RATE: 69 BPM | DIASTOLIC BLOOD PRESSURE: 71 MMHG | SYSTOLIC BLOOD PRESSURE: 123 MMHG

## 2023-07-24 DIAGNOSIS — F90.2 ADHD (ATTENTION DEFICIT HYPERACTIVITY DISORDER), COMBINED TYPE: Primary | ICD-10-CM

## 2023-07-24 DIAGNOSIS — F41.9 ANXIETY AND DEPRESSION: ICD-10-CM

## 2023-07-24 DIAGNOSIS — F33.1 MAJOR DEPRESSIVE DISORDER, RECURRENT EPISODE, MODERATE: ICD-10-CM

## 2023-07-24 DIAGNOSIS — F32.A ANXIETY AND DEPRESSION: ICD-10-CM

## 2023-07-24 DIAGNOSIS — F41.1 GAD (GENERALIZED ANXIETY DISORDER): ICD-10-CM

## 2023-07-24 PROCEDURE — 99999 PR PBB SHADOW E&M-EST. PATIENT-LVL II: ICD-10-PCS | Mod: PBBFAC,,, | Performed by: PSYCHOLOGIST

## 2023-07-24 PROCEDURE — 1159F MED LIST DOCD IN RCRD: CPT | Mod: CPTII,S$GLB,, | Performed by: PSYCHOLOGIST

## 2023-07-24 PROCEDURE — 99214 PR OFFICE/OUTPT VISIT, EST, LEVL IV, 30-39 MIN: ICD-10-PCS | Mod: S$GLB,,, | Performed by: PSYCHOLOGIST

## 2023-07-24 PROCEDURE — 3074F SYST BP LT 130 MM HG: CPT | Mod: CPTII,S$GLB,, | Performed by: PSYCHOLOGIST

## 2023-07-24 PROCEDURE — 3078F DIAST BP <80 MM HG: CPT | Mod: CPTII,S$GLB,, | Performed by: PSYCHOLOGIST

## 2023-07-24 PROCEDURE — 99999 PR PBB SHADOW E&M-EST. PATIENT-LVL II: CPT | Mod: PBBFAC,,, | Performed by: PSYCHOLOGIST

## 2023-07-24 PROCEDURE — 3074F PR MOST RECENT SYSTOLIC BLOOD PRESSURE < 130 MM HG: ICD-10-PCS | Mod: CPTII,S$GLB,, | Performed by: PSYCHOLOGIST

## 2023-07-24 PROCEDURE — 3078F PR MOST RECENT DIASTOLIC BLOOD PRESSURE < 80 MM HG: ICD-10-PCS | Mod: CPTII,S$GLB,, | Performed by: PSYCHOLOGIST

## 2023-07-24 PROCEDURE — 99214 OFFICE O/P EST MOD 30 MIN: CPT | Mod: S$GLB,,, | Performed by: PSYCHOLOGIST

## 2023-07-24 PROCEDURE — 1159F PR MEDICATION LIST DOCUMENTED IN MEDICAL RECORD: ICD-10-PCS | Mod: CPTII,S$GLB,, | Performed by: PSYCHOLOGIST

## 2023-07-24 RX ORDER — BUSPIRONE HYDROCHLORIDE 15 MG/1
15 TABLET ORAL 2 TIMES DAILY
Qty: 60 TABLET | Refills: 2 | Status: SHIPPED | OUTPATIENT
Start: 2023-07-24 | End: 2023-09-22 | Stop reason: SDUPTHER

## 2023-07-24 RX ORDER — DEXTROAMPHETAMINE SACCHARATE, AMPHETAMINE ASPARTATE, DEXTROAMPHETAMINE SULFATE AND AMPHETAMINE SULFATE 3.75; 3.75; 3.75; 3.75 MG/1; MG/1; MG/1; MG/1
15 TABLET ORAL 2 TIMES DAILY
Qty: 60 TABLET | Refills: 0 | Status: SHIPPED | OUTPATIENT
Start: 2023-07-24 | End: 2023-08-21 | Stop reason: SDUPTHER

## 2023-07-24 RX ORDER — FLUOXETINE HYDROCHLORIDE 20 MG/1
20 CAPSULE ORAL DAILY
Qty: 30 CAPSULE | Refills: 2 | Status: SHIPPED | OUTPATIENT
Start: 2023-07-24 | End: 2023-09-22 | Stop reason: SDUPTHER

## 2023-07-24 RX ORDER — FLUOXETINE HYDROCHLORIDE 40 MG/1
40 CAPSULE ORAL DAILY
Qty: 30 CAPSULE | Refills: 2 | Status: SHIPPED | OUTPATIENT
Start: 2023-07-24 | End: 2023-09-22 | Stop reason: SDUPTHER

## 2023-07-24 NOTE — PROGRESS NOTES
"MEDICATION MANAGEMENT SESSION    Name: Elida Ricks  Age: 28 y.o.  : 1995    Preferred Name: Elida    Referring provider: Juliette Cook NP    Reason for Visit:  Medication follow up    Summary of Visit:   Pt reports that her mood and anxiety have been better with increase to Prozac 60mg qd. She's had some irritability and fatigue but no depression. Continued daily stress/anxiety and tendency to worry but overall decreased anxiety symptoms and no panic episodes. The effects of Adderall XR 20mg wear off in early afternoon/late morning 3 hours after taking it. She becomes unfocused, task avoidant, and distracted. Discussed with pt changing to IR and dosing bid to address this issue and reviewed associated benefits and risks. 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    Current Symptoms:   ADHD: When Adderall XR 20mg wears off after 3 hours: inattentive, no follow-through, avoids effortful tasks, easily distracted   Depressive Disorder: irritable mood, tired/fatigued, concentration problems   Anxiety Disorder: anxiety/nervousness, fatigue, excessive worry   Panic Disorder: nervous   Manic Disorder: denied   Psychotic Disorder: denied   Substance Use:  Alcohol: Occasionally      Review of Systems   Constitutional:  Positive for fatigue. Negative for activity change, appetite change and unexpected weight change.   Respiratory:  Negative for shortness of breath.    Psychiatric/Behavioral:  Positive for decreased concentration. Negative for agitation, behavioral problems, confusion, dysphoric mood, hallucinations, self-injury, sleep disturbance and suicidal ideas. The patient is nervous/anxious. The patient is not hyperactive.       Constitutional:  Vitals:  Most recent vital signs were reviewed.   Last 3 sets of Vitals    Vitals - 1 value per visit 2023 " "4/13/2023 7/24/2023   SYSTOLIC 122 139 123   DIASTOLIC 78 83 71   Pulse 60 74 69   Temp 97.5 - -   Resp - - -   SPO2 99 - -   Weight (lb) 218.7 217.81 -   Weight (kg) 99.2 98.8 -   Height 60 - -   BMI (Calculated) 42.7 - -   VISIT REPORT - - -   Pain Score  - - -   Some encounter information is confidential and restricted. Go to Review Flowsheets activity to see all data.          Psychiatric:  Oriented: x 3   Attitude: cooperative   Eye Contact: good   Behavior: wnl   Mood: "okay"  Affect: appropriate range   Attention: intact   Concentration: grossly intact   Thought Process: goal directed   Speech: intelligible  Volume: WNL   Quantity: WNL   Rhythm: WNL  Insight: fair to good   Threats: no SI / HI   Memory: Grossly intact  Psychosis: denies all   Estimate of Intellectual Function: average   Judgment:  good  Relevant Elements of Neurological Exam: normal gait     Allergy Review:   Review of patient's allergies indicates:  No Known Allergies     Medical Problem List:   Patient Active Problem List   Diagnosis    Major depressive disorder with single episode, in remission      Encounter Diagnoses   Name Primary?    Anxiety and depression     Major depressive disorder, recurrent episode, moderate     ADHD (attention deficit hyperactivity disorder), combined type Yes    PRASANNA (generalized anxiety disorder)       PLAN:  Medication Management: Continue current medications. Discussed risks, benefits, and alternatives to treatment plan documented above with patient. I answered all patient questions related to this plan, and patient expressed understanding and agreement.      Follow up in about 2 months (around 9/24/2023) for Medication follow up.     Medication List with Changes/Refills   New Medications    DEXTROAMPHETAMINE-AMPHETAMINE (ADDERALL) 15 MG TABLET    Take 1 tablet (15 mg total) by mouth 2 (two) times a day.   Current Medications    NORGESTIMATE-ETHINYL ESTRADIOL (ORTHO-CYCLEN) 0.25-35 MG-MCG PER TABLET    Take 1 " tablet by mouth once daily.   Changed and/or Refilled Medications    Modified Medication Previous Medication    BUSPIRONE (BUSPAR) 15 MG TABLET busPIRone (BUSPAR) 10 MG tablet       Take 1 tablet (15 mg total) by mouth 2 (two) times daily.    Take 1 tablet (10 mg total) by mouth 2 (two) times daily. (For Anxiety)    FLUOXETINE 20 MG CAPSULE FLUoxetine 20 MG capsule       Take 1 capsule (20 mg total) by mouth once daily.    Take 1 capsule (20 mg total) by mouth once daily.    FLUOXETINE 40 MG CAPSULE FLUoxetine 40 MG capsule       Take 1 capsule (40 mg total) by mouth once daily.    Take 1 capsule (40 mg total) by mouth once daily.   Discontinued Medications    DEXTROAMPHETAMINE-AMPHETAMINE (ADDERALL XR) 20 MG 24 HR CAPSULE    Take 1 capsule (20 mg total) by mouth every morning.      Time spent with pt including note preparation: 30 minutes     Yadi Frank, PhD, MP  Medical Psychologist

## 2023-08-21 ENCOUNTER — PATIENT MESSAGE (OUTPATIENT)
Dept: PSYCHIATRY | Facility: CLINIC | Age: 28
End: 2023-08-21
Payer: COMMERCIAL

## 2023-08-21 DIAGNOSIS — F90.2 ADHD (ATTENTION DEFICIT HYPERACTIVITY DISORDER), COMBINED TYPE: ICD-10-CM

## 2023-08-22 RX ORDER — DEXTROAMPHETAMINE SACCHARATE, AMPHETAMINE ASPARTATE, DEXTROAMPHETAMINE SULFATE AND AMPHETAMINE SULFATE 3.75; 3.75; 3.75; 3.75 MG/1; MG/1; MG/1; MG/1
15 TABLET ORAL 2 TIMES DAILY
Qty: 60 TABLET | Refills: 0 | Status: SHIPPED | OUTPATIENT
Start: 2023-08-22 | End: 2023-09-22 | Stop reason: SDUPTHER

## 2023-09-11 ENCOUNTER — PATIENT MESSAGE (OUTPATIENT)
Dept: PSYCHIATRY | Facility: CLINIC | Age: 28
End: 2023-09-11
Payer: COMMERCIAL

## 2023-09-18 ENCOUNTER — TELEPHONE (OUTPATIENT)
Dept: PSYCHIATRY | Facility: CLINIC | Age: 28
End: 2023-09-18
Payer: COMMERCIAL

## 2023-09-22 DIAGNOSIS — F33.1 MAJOR DEPRESSIVE DISORDER, RECURRENT EPISODE, MODERATE: ICD-10-CM

## 2023-09-22 DIAGNOSIS — F41.1 GAD (GENERALIZED ANXIETY DISORDER): ICD-10-CM

## 2023-09-22 DIAGNOSIS — F32.A ANXIETY AND DEPRESSION: ICD-10-CM

## 2023-09-22 DIAGNOSIS — F41.9 ANXIETY AND DEPRESSION: ICD-10-CM

## 2023-09-22 DIAGNOSIS — F90.2 ADHD (ATTENTION DEFICIT HYPERACTIVITY DISORDER), COMBINED TYPE: ICD-10-CM

## 2023-09-22 RX ORDER — FLUOXETINE HYDROCHLORIDE 20 MG/1
20 CAPSULE ORAL DAILY
Qty: 30 CAPSULE | Refills: 2 | Status: SHIPPED | OUTPATIENT
Start: 2023-09-22 | End: 2023-10-03 | Stop reason: SDUPTHER

## 2023-09-22 RX ORDER — FLUOXETINE HYDROCHLORIDE 40 MG/1
40 CAPSULE ORAL DAILY
Qty: 30 CAPSULE | Refills: 2 | Status: SHIPPED | OUTPATIENT
Start: 2023-09-22 | End: 2023-10-03 | Stop reason: SDUPTHER

## 2023-09-22 RX ORDER — DEXTROAMPHETAMINE SACCHARATE, AMPHETAMINE ASPARTATE, DEXTROAMPHETAMINE SULFATE AND AMPHETAMINE SULFATE 3.75; 3.75; 3.75; 3.75 MG/1; MG/1; MG/1; MG/1
15 TABLET ORAL 2 TIMES DAILY
Qty: 60 TABLET | Refills: 0 | Status: SHIPPED | OUTPATIENT
Start: 2023-09-22 | End: 2023-10-03

## 2023-09-22 RX ORDER — BUSPIRONE HYDROCHLORIDE 15 MG/1
15 TABLET ORAL 2 TIMES DAILY
Qty: 60 TABLET | Refills: 2 | Status: SHIPPED | OUTPATIENT
Start: 2023-09-22 | End: 2023-10-03 | Stop reason: SDUPTHER

## 2023-10-03 ENCOUNTER — OFFICE VISIT (OUTPATIENT)
Dept: PSYCHIATRY | Facility: CLINIC | Age: 28
End: 2023-10-03
Payer: COMMERCIAL

## 2023-10-03 DIAGNOSIS — F41.9 ANXIETY AND DEPRESSION: ICD-10-CM

## 2023-10-03 DIAGNOSIS — F33.1 MAJOR DEPRESSIVE DISORDER, RECURRENT EPISODE, MODERATE: ICD-10-CM

## 2023-10-03 DIAGNOSIS — F32.A ANXIETY AND DEPRESSION: ICD-10-CM

## 2023-10-03 DIAGNOSIS — F90.2 ADHD (ATTENTION DEFICIT HYPERACTIVITY DISORDER), COMBINED TYPE: Primary | ICD-10-CM

## 2023-10-03 DIAGNOSIS — F41.1 GAD (GENERALIZED ANXIETY DISORDER): ICD-10-CM

## 2023-10-03 PROCEDURE — 1159F PR MEDICATION LIST DOCUMENTED IN MEDICAL RECORD: ICD-10-PCS | Mod: CPTII,95,, | Performed by: PSYCHOLOGIST

## 2023-10-03 PROCEDURE — 1159F MED LIST DOCD IN RCRD: CPT | Mod: CPTII,95,, | Performed by: PSYCHOLOGIST

## 2023-10-03 PROCEDURE — 99214 PR OFFICE/OUTPT VISIT, EST, LEVL IV, 30-39 MIN: ICD-10-PCS | Mod: 95,,, | Performed by: PSYCHOLOGIST

## 2023-10-03 PROCEDURE — 99214 OFFICE O/P EST MOD 30 MIN: CPT | Mod: 95,,, | Performed by: PSYCHOLOGIST

## 2023-10-03 NOTE — PROGRESS NOTES
"MEDICATION MANAGEMENT SESSION: VIRTUAL    Name: Elida Ricks  Age: 28 y.o.  : 1995    Preferred Name: Elida    Referring provider: Juliette Cook NP    Reason for Visit:  Medication follow up    Summary of Visit:  Pt reports she has been doing well with her medications since last visit. She reports no problems with depression, and only minor, daily anxiety that has been manageable with Prozac 60 mg qd and Buspar 15 mg bid. She states that though Adderall XR 15 mg has worked well, its effects wear off after a few hours then "my brain stops" and she becomes forgetful and unable to complete tasks she initiated. She is busy with her children and enjoying their activities like [employer].    Current Symptoms:   ADHD: forgetful, easily distracted   Depressive Disorder: concentration problems   Anxiety Disorder: anxiety/nervousness, concentration problems   Panic Disorder: nervous   Manic Disorder: denied   Psychotic Disorder: denied   Substance Use:  Alcohol: Occasionally      Review of Systems   Constitutional:  Negative for activity change, appetite change, fatigue and unexpected weight change.   Respiratory:  Negative for shortness of breath.    Psychiatric/Behavioral:  Positive for decreased concentration. Negative for agitation, behavioral problems, confusion, dysphoric mood, hallucinations, self-injury, sleep disturbance and suicidal ideas. The patient is nervous/anxious. The patient is not hyperactive.         Constitutional:  Vitals:  Most recent vital signs were reviewed.   Last 3 sets of Vitals        2023    12:21 PM 2023    10:31 AM 2023    10:12 AM   Vitals - 1 value per visit   SYSTOLIC 139 123 125   DIASTOLIC 83 71 80   Pulse 74 69    Weight (lb) 217.81  193   Weight (kg) 98.8  87.544   Height   5' (1.524 m)   BMI (Calculated)   37.7          Psychiatric:  Oriented: x 3   Attitude: cooperative   Eye Contact: good   Behavior: wnl   Mood: "good"  Affect: appropriate range   Attention: " intact   Concentration: grossly intact   Thought Process: goal directed   Speech: intelligible  Volume: WNL   Quantity: WNL   Rhythm: WNL  Insight: fair to good   Threats: no SI / HI   Memory: Grossly intact  Psychosis: denies all   Estimate of Intellectual Function: average   Judgment:  good  Relevant Elements of Neurological Exam: normal gait     Allergy Review:   Review of patient's allergies indicates:  No Known Allergies     Medical Problem List:   Patient Active Problem List   Diagnosis    Major depressive disorder with single episode, in remission      Encounter Diagnoses   Name Primary?    Anxiety and depression     PRASANNA (generalized anxiety disorder)     Major depressive disorder, recurrent episode, moderate     ADHD (attention deficit hyperactivity disorder), combined type Yes      PLAN:  Medication Management: Continue current medications. Discussed risks, benefits, and alternatives to treatment plan documented above with patient. I answered all patient questions related to this plan, and patient expressed understanding and agreement.      Follow up in about 3 months (around 1/3/2024) for Medication follow up.     Medication List with Changes/Refills   New Medications    DEXTROAMPHETAMINE-AMPHETAMINE (ADDERALL) 20 MG TABLET    Take 1 tablet by mouth 2 (two) times a day.   Current Medications    NORGESTIMATE-ETHINYL ESTRADIOL (ORTHO-CYCLEN) 0.25-35 MG-MCG PER TABLET    Take 1 tablet by mouth once daily.   Changed and/or Refilled Medications    Modified Medication Previous Medication    BUSPIRONE (BUSPAR) 15 MG TABLET busPIRone (BUSPAR) 15 MG tablet       Take 1 tablet (15 mg total) by mouth 2 (two) times daily.    Take 1 tablet (15 mg total) by mouth 2 (two) times daily.    FLUOXETINE 20 MG CAPSULE FLUoxetine 20 MG capsule       Take 1 capsule (20 mg total) by mouth once daily.    Take 1 capsule (20 mg total) by mouth once daily.    FLUOXETINE 40 MG CAPSULE FLUoxetine 40 MG capsule       Take 1 capsule (40  mg total) by mouth once daily.    Take 1 capsule (40 mg total) by mouth once daily.   Discontinued Medications    DEXTROAMPHETAMINE-AMPHETAMINE (ADDERALL) 15 MG TABLET    Take 1 tablet (15 mg total) by mouth 2 (two) times a day.      Time spent with pt including note preparation: 30 minutes     Yadi Frank, PhD, MP  Medical Psychologist

## 2023-10-04 ENCOUNTER — PATIENT MESSAGE (OUTPATIENT)
Dept: PSYCHIATRY | Facility: CLINIC | Age: 28
End: 2023-10-04
Payer: COMMERCIAL

## 2023-10-04 RX ORDER — DEXTROAMPHETAMINE SACCHARATE, AMPHETAMINE ASPARTATE, DEXTROAMPHETAMINE SULFATE AND AMPHETAMINE SULFATE 5; 5; 5; 5 MG/1; MG/1; MG/1; MG/1
1 TABLET ORAL 2 TIMES DAILY
Qty: 60 TABLET | Refills: 0 | Status: SHIPPED | OUTPATIENT
Start: 2023-10-04 | End: 2023-10-04

## 2023-10-04 RX ORDER — DEXTROAMPHETAMINE SACCHARATE, AMPHETAMINE ASPARTATE, DEXTROAMPHETAMINE SULFATE AND AMPHETAMINE SULFATE 5; 5; 5; 5 MG/1; MG/1; MG/1; MG/1
1 TABLET ORAL 2 TIMES DAILY
Qty: 60 TABLET | Refills: 0 | Status: SHIPPED | OUTPATIENT
Start: 2023-10-04 | End: 2023-11-01 | Stop reason: SDUPTHER

## 2023-10-04 RX ORDER — FLUOXETINE HYDROCHLORIDE 20 MG/1
20 CAPSULE ORAL DAILY
Qty: 30 CAPSULE | Refills: 2 | Status: SHIPPED | OUTPATIENT
Start: 2023-10-04 | End: 2023-12-06 | Stop reason: SDUPTHER

## 2023-10-04 RX ORDER — FLUOXETINE HYDROCHLORIDE 40 MG/1
40 CAPSULE ORAL DAILY
Qty: 30 CAPSULE | Refills: 2 | Status: SHIPPED | OUTPATIENT
Start: 2023-10-04 | End: 2023-12-06 | Stop reason: SDUPTHER

## 2023-10-04 RX ORDER — BUSPIRONE HYDROCHLORIDE 15 MG/1
15 TABLET ORAL 2 TIMES DAILY
Qty: 60 TABLET | Refills: 2 | Status: SHIPPED | OUTPATIENT
Start: 2023-10-04 | End: 2023-12-06 | Stop reason: SDUPTHER

## 2023-11-01 DIAGNOSIS — F41.9 ANXIETY AND DEPRESSION: ICD-10-CM

## 2023-11-01 DIAGNOSIS — F41.1 GAD (GENERALIZED ANXIETY DISORDER): ICD-10-CM

## 2023-11-01 DIAGNOSIS — F33.1 MAJOR DEPRESSIVE DISORDER, RECURRENT EPISODE, MODERATE: ICD-10-CM

## 2023-11-01 DIAGNOSIS — F32.A ANXIETY AND DEPRESSION: ICD-10-CM

## 2023-11-01 DIAGNOSIS — F90.2 ADHD (ATTENTION DEFICIT HYPERACTIVITY DISORDER), COMBINED TYPE: ICD-10-CM

## 2023-11-02 RX ORDER — FLUOXETINE HYDROCHLORIDE 40 MG/1
40 CAPSULE ORAL DAILY
Qty: 30 CAPSULE | Refills: 2 | OUTPATIENT
Start: 2023-11-02 | End: 2024-01-31

## 2023-11-02 RX ORDER — BUSPIRONE HYDROCHLORIDE 15 MG/1
15 TABLET ORAL 2 TIMES DAILY
Qty: 60 TABLET | Refills: 2 | OUTPATIENT
Start: 2023-11-02 | End: 2024-01-31

## 2023-11-02 RX ORDER — FLUOXETINE HYDROCHLORIDE 20 MG/1
20 CAPSULE ORAL DAILY
Qty: 30 CAPSULE | Refills: 2 | OUTPATIENT
Start: 2023-11-02 | End: 2024-01-31

## 2023-11-02 RX ORDER — DEXTROAMPHETAMINE SACCHARATE, AMPHETAMINE ASPARTATE, DEXTROAMPHETAMINE SULFATE AND AMPHETAMINE SULFATE 5; 5; 5; 5 MG/1; MG/1; MG/1; MG/1
1 TABLET ORAL 2 TIMES DAILY
Qty: 60 TABLET | Refills: 0 | Status: SHIPPED | OUTPATIENT
Start: 2023-11-02 | End: 2023-12-06 | Stop reason: SDUPTHER

## 2023-12-06 DIAGNOSIS — F41.1 GAD (GENERALIZED ANXIETY DISORDER): ICD-10-CM

## 2023-12-06 DIAGNOSIS — F41.9 ANXIETY AND DEPRESSION: ICD-10-CM

## 2023-12-06 DIAGNOSIS — F32.A ANXIETY AND DEPRESSION: ICD-10-CM

## 2023-12-06 DIAGNOSIS — F90.2 ADHD (ATTENTION DEFICIT HYPERACTIVITY DISORDER), COMBINED TYPE: ICD-10-CM

## 2023-12-06 DIAGNOSIS — F33.1 MAJOR DEPRESSIVE DISORDER, RECURRENT EPISODE, MODERATE: ICD-10-CM

## 2023-12-07 RX ORDER — BUSPIRONE HYDROCHLORIDE 15 MG/1
15 TABLET ORAL 2 TIMES DAILY
Qty: 60 TABLET | Refills: 2 | Status: SHIPPED | OUTPATIENT
Start: 2023-12-07 | End: 2023-12-21 | Stop reason: SDUPTHER

## 2023-12-07 RX ORDER — FLUOXETINE HYDROCHLORIDE 40 MG/1
40 CAPSULE ORAL DAILY
Qty: 30 CAPSULE | Refills: 2 | Status: SHIPPED | OUTPATIENT
Start: 2023-12-07 | End: 2023-12-21 | Stop reason: SDUPTHER

## 2023-12-07 RX ORDER — DEXTROAMPHETAMINE SACCHARATE, AMPHETAMINE ASPARTATE, DEXTROAMPHETAMINE SULFATE AND AMPHETAMINE SULFATE 5; 5; 5; 5 MG/1; MG/1; MG/1; MG/1
1 TABLET ORAL 2 TIMES DAILY
Qty: 60 TABLET | Refills: 0 | Status: SHIPPED | OUTPATIENT
Start: 2023-12-07 | End: 2023-12-21

## 2023-12-07 RX ORDER — FLUOXETINE HYDROCHLORIDE 20 MG/1
20 CAPSULE ORAL DAILY
Qty: 30 CAPSULE | Refills: 2 | Status: SHIPPED | OUTPATIENT
Start: 2023-12-07 | End: 2023-12-21 | Stop reason: SDUPTHER

## 2023-12-19 ENCOUNTER — TELEPHONE (OUTPATIENT)
Dept: PSYCHIATRY | Facility: CLINIC | Age: 28
End: 2023-12-19
Payer: COMMERCIAL

## 2023-12-21 ENCOUNTER — OFFICE VISIT (OUTPATIENT)
Dept: PSYCHIATRY | Facility: CLINIC | Age: 28
End: 2023-12-21
Payer: COMMERCIAL

## 2023-12-21 VITALS — HEART RATE: 90 BPM | DIASTOLIC BLOOD PRESSURE: 95 MMHG | SYSTOLIC BLOOD PRESSURE: 153 MMHG

## 2023-12-21 DIAGNOSIS — F32.A ANXIETY AND DEPRESSION: ICD-10-CM

## 2023-12-21 DIAGNOSIS — F33.1 MAJOR DEPRESSIVE DISORDER, RECURRENT EPISODE, MODERATE: ICD-10-CM

## 2023-12-21 DIAGNOSIS — F32.5 MAJOR DEPRESSIVE DISORDER WITH SINGLE EPISODE, IN REMISSION: ICD-10-CM

## 2023-12-21 DIAGNOSIS — F41.1 GAD (GENERALIZED ANXIETY DISORDER): Primary | ICD-10-CM

## 2023-12-21 DIAGNOSIS — F90.2 ADHD (ATTENTION DEFICIT HYPERACTIVITY DISORDER), COMBINED TYPE: ICD-10-CM

## 2023-12-21 DIAGNOSIS — F41.9 ANXIETY AND DEPRESSION: ICD-10-CM

## 2023-12-21 PROCEDURE — 99999 PR PBB SHADOW E&M-EST. PATIENT-LVL II: CPT | Mod: PBBFAC,,, | Performed by: PSYCHOLOGIST

## 2023-12-21 PROCEDURE — 99215 PR OFFICE/OUTPT VISIT, EST, LEVL V, 40-54 MIN: ICD-10-PCS | Mod: S$GLB,,, | Performed by: PSYCHOLOGIST

## 2023-12-21 PROCEDURE — 3077F SYST BP >= 140 MM HG: CPT | Mod: CPTII,S$GLB,, | Performed by: PSYCHOLOGIST

## 2023-12-21 PROCEDURE — 1159F MED LIST DOCD IN RCRD: CPT | Mod: CPTII,S$GLB,, | Performed by: PSYCHOLOGIST

## 2023-12-21 PROCEDURE — 3077F PR MOST RECENT SYSTOLIC BLOOD PRESSURE >= 140 MM HG: ICD-10-PCS | Mod: CPTII,S$GLB,, | Performed by: PSYCHOLOGIST

## 2023-12-21 PROCEDURE — 3080F DIAST BP >= 90 MM HG: CPT | Mod: CPTII,S$GLB,, | Performed by: PSYCHOLOGIST

## 2023-12-21 PROCEDURE — 99999 PR PBB SHADOW E&M-EST. PATIENT-LVL II: ICD-10-PCS | Mod: PBBFAC,,, | Performed by: PSYCHOLOGIST

## 2023-12-21 PROCEDURE — 1159F PR MEDICATION LIST DOCUMENTED IN MEDICAL RECORD: ICD-10-PCS | Mod: CPTII,S$GLB,, | Performed by: PSYCHOLOGIST

## 2023-12-21 PROCEDURE — 3080F PR MOST RECENT DIASTOLIC BLOOD PRESSURE >= 90 MM HG: ICD-10-PCS | Mod: CPTII,S$GLB,, | Performed by: PSYCHOLOGIST

## 2023-12-21 PROCEDURE — 99215 OFFICE O/P EST HI 40 MIN: CPT | Mod: S$GLB,,, | Performed by: PSYCHOLOGIST

## 2023-12-21 RX ORDER — DEXTROAMPHETAMINE SACCHARATE, AMPHETAMINE ASPARTATE, DEXTROAMPHETAMINE SULFATE AND AMPHETAMINE SULFATE 5; 5; 5; 5 MG/1; MG/1; MG/1; MG/1
1 TABLET ORAL DAILY
Qty: 30 TABLET | Refills: 0 | Status: SHIPPED | OUTPATIENT
Start: 2023-12-21 | End: 2024-01-20

## 2023-12-21 RX ORDER — BUSPIRONE HYDROCHLORIDE 15 MG/1
15 TABLET ORAL 2 TIMES DAILY
Qty: 60 TABLET | Refills: 2 | Status: SHIPPED | OUTPATIENT
Start: 2023-12-21 | End: 2024-03-21 | Stop reason: SDUPTHER

## 2023-12-21 RX ORDER — DEXTROAMPHETAMINE SACCHARATE, AMPHETAMINE ASPARTATE, DEXTROAMPHETAMINE SULFATE AND AMPHETAMINE SULFATE 5; 5; 5; 5 MG/1; MG/1; MG/1; MG/1
1 TABLET ORAL DAILY
Qty: 30 TABLET | Refills: 0 | Status: SHIPPED | OUTPATIENT
Start: 2024-01-19 | End: 2024-03-21 | Stop reason: DRUGHIGH

## 2023-12-21 RX ORDER — DEXTROAMPHETAMINE SACCHARATE, AMPHETAMINE ASPARTATE, DEXTROAMPHETAMINE SULFATE AND AMPHETAMINE SULFATE 7.5; 7.5; 7.5; 7.5 MG/1; MG/1; MG/1; MG/1
30 TABLET ORAL DAILY
Qty: 30 TABLET | Refills: 0 | Status: SHIPPED | OUTPATIENT
Start: 2024-02-18 | End: 2024-02-25 | Stop reason: SDUPTHER

## 2023-12-21 RX ORDER — DEXTROAMPHETAMINE SACCHARATE, AMPHETAMINE ASPARTATE, DEXTROAMPHETAMINE SULFATE AND AMPHETAMINE SULFATE 7.5; 7.5; 7.5; 7.5 MG/1; MG/1; MG/1; MG/1
30 TABLET ORAL DAILY
Qty: 30 TABLET | Refills: 0 | Status: SHIPPED | OUTPATIENT
Start: 2024-01-19 | End: 2024-02-25 | Stop reason: SDUPTHER

## 2023-12-21 RX ORDER — DEXTROAMPHETAMINE SACCHARATE, AMPHETAMINE ASPARTATE, DEXTROAMPHETAMINE SULFATE AND AMPHETAMINE SULFATE 5; 5; 5; 5 MG/1; MG/1; MG/1; MG/1
1 TABLET ORAL DAILY
Qty: 30 TABLET | Refills: 0 | Status: SHIPPED | OUTPATIENT
Start: 2024-02-18 | End: 2024-03-21

## 2023-12-21 RX ORDER — FLUOXETINE HYDROCHLORIDE 40 MG/1
40 CAPSULE ORAL DAILY
Qty: 30 CAPSULE | Refills: 2 | Status: SHIPPED | OUTPATIENT
Start: 2023-12-21 | End: 2024-03-21 | Stop reason: SDUPTHER

## 2023-12-21 RX ORDER — DEXTROAMPHETAMINE SACCHARATE, AMPHETAMINE ASPARTATE, DEXTROAMPHETAMINE SULFATE AND AMPHETAMINE SULFATE 7.5; 7.5; 7.5; 7.5 MG/1; MG/1; MG/1; MG/1
30 TABLET ORAL DAILY
Qty: 30 TABLET | Refills: 0 | Status: SHIPPED | OUTPATIENT
Start: 2023-12-21 | End: 2024-01-20

## 2023-12-21 RX ORDER — FLUOXETINE HYDROCHLORIDE 20 MG/1
20 CAPSULE ORAL DAILY
Qty: 30 CAPSULE | Refills: 2 | Status: SHIPPED | OUTPATIENT
Start: 2023-12-21 | End: 2024-03-21 | Stop reason: SDUPTHER

## 2024-02-25 DIAGNOSIS — F90.2 ADHD (ATTENTION DEFICIT HYPERACTIVITY DISORDER), COMBINED TYPE: ICD-10-CM

## 2024-02-27 RX ORDER — DEXTROAMPHETAMINE SACCHARATE, AMPHETAMINE ASPARTATE, DEXTROAMPHETAMINE SULFATE AND AMPHETAMINE SULFATE 7.5; 7.5; 7.5; 7.5 MG/1; MG/1; MG/1; MG/1
30 TABLET ORAL DAILY
Qty: 30 TABLET | Refills: 0 | Status: SHIPPED | OUTPATIENT
Start: 2024-02-27

## 2024-02-27 RX ORDER — DEXTROAMPHETAMINE SACCHARATE, AMPHETAMINE ASPARTATE, DEXTROAMPHETAMINE SULFATE AND AMPHETAMINE SULFATE 7.5; 7.5; 7.5; 7.5 MG/1; MG/1; MG/1; MG/1
30 TABLET ORAL DAILY
Qty: 30 TABLET | Refills: 0 | Status: SHIPPED | OUTPATIENT
Start: 2024-02-27 | End: 2024-03-21

## 2024-03-19 ENCOUNTER — TELEPHONE (OUTPATIENT)
Dept: PSYCHIATRY | Facility: CLINIC | Age: 29
End: 2024-03-19
Payer: COMMERCIAL

## 2024-03-20 NOTE — PROGRESS NOTES
MEDICATION MANAGEMENT SESSION    Name: Elida Ricks  Age: 28 y.o.  : 1995    Preferred Name: Elida    Referring provider: Juliette Cook NP    Reason for Visit:  Medication follow up    Summary of Visit:  Pt reports the change to Adderall 30 mg qam/20 mg qd prn has helped with her focus and sustained attention at work and at home. She states on some days she would be fine with second dose of 10 mg, but on others feels like she needs second dose of 30 mg. Discussed with pt titrating to Adderall 30 mg bid (second dose 15-30 mg) and reviewed associated benefits and risks. Prozac 60 mg qd and Buspar 15 mg bid continue to support stable mood and anxiety at manageable level.    She continues to experience marital problems, with little apparent effort from her  to improve things despite him agreeing to counseling. They started couples counseling 5 weeks ago, but he doesn't apply skills, changes they discuss during sessions. She is sleeping fine; her appetite is normal.     Current Symptoms:   ADHD: inattentive, no follow-through   Depressive Disorder: tired/fatigued   Anxiety Disorder: anxiety/nervousness, irritability   Panic Disorder: denied   Manic Disorder: denied   Psychotic Disorder: denied   Substance Use:  Alcohol: Occasionally      Review of Systems   Constitutional:  Positive for fatigue. Negative for activity change, appetite change and unexpected weight change.   Respiratory:  Negative for shortness of breath.    Psychiatric/Behavioral:  Positive for decreased concentration. Negative for agitation, behavioral problems, confusion, dysphoric mood, hallucinations, self-injury, sleep disturbance and suicidal ideas. The patient is nervous/anxious. The patient is not hyperactive.         Constitutional:  Vitals:  Most recent vital signs were reviewed.   Last 3 sets of Vitals        2023    10:31 AM 2023    10:12 AM 2023    10:27 AM   Vitals - 1 value per visit   SYSTOLIC 123 125  "153   DIASTOLIC 71 80 95   Pulse 69  90   Weight (lb)  193    Weight (kg)  87.544    Height  5' (1.524 m)    BMI (Calculated)  37.7           Psychiatric:  Oriented: x 3   Attitude: cooperative   Eye Contact: good   Behavior: wnl   Mood: "good"  Affect: appropriate range   Attention: intact   Concentration: grossly intact   Thought Process: goal directed   Speech: intelligible  Volume: WNL   Quantity: WNL   Rhythm: WNL  Insight: fair to good   Threats: no SI / HI   Memory: Grossly intact  Psychosis: denies all   Estimate of Intellectual Function: average   Judgment: good   Relevant Elements of Neurological Exam: normal gait     Allergy Review:   Review of patient's allergies indicates:  No Known Allergies     Medical Problem List:   Patient Active Problem List   Diagnosis    Major depressive disorder with single episode, in remission      Encounter Diagnoses   Name Primary?    ADHD (attention deficit hyperactivity disorder), combined type Yes    PRASANNA (generalized anxiety disorder)     Major depressive disorder with single episode, in remission     Anxiety and depression     Major depressive disorder, recurrent episode, moderate       PLAN:  Medication Management: Continue current medications. Discussed risks, benefits, and alternatives to treatment plan documented above with patient. I answered all patient questions related to this plan, and patient expressed understanding and agreement.      Follow up in about 3 months (around 6/21/2024) for Medication follow up.     Medication List with Changes/Refills   New Medications    DEXTROAMPHETAMINE-AMPHETAMINE (ADDERALL) 30 MG TAB    Take 1 tablet (30 mg total) by mouth 2 (two) times a day.    DEXTROAMPHETAMINE-AMPHETAMINE (ADDERALL) 30 MG TAB    Take 1 tablet (30 mg total) by mouth 2 (two) times a day.   Current Medications    DEXTROAMPHETAMINE-AMPHETAMINE (ADDERALL) 30 MG TAB    Take 1 tablet (30 mg total) by mouth Daily.    NORGESTIMATE-ETHINYL ESTRADIOL (ORTHO-CYCLEN) " 0.25-35 MG-MCG PER TABLET    Take 1 tablet by mouth once daily.   Changed and/or Refilled Medications    Modified Medication Previous Medication    BUSPIRONE (BUSPAR) 15 MG TABLET busPIRone (BUSPAR) 15 MG tablet       Take 1 tablet (15 mg total) by mouth 2 (two) times daily.    Take 1 tablet (15 mg total) by mouth 2 (two) times daily.    DEXTROAMPHETAMINE-AMPHETAMINE (ADDERALL) 30 MG TAB dextroamphetamine-amphetamine (ADDERALL) 30 mg Tab       Take 1 tablet (30 mg total) by mouth 2 (two) times a day.    Take 1 tablet (30 mg total) by mouth Daily.    FLUOXETINE 20 MG CAPSULE FLUoxetine 20 MG capsule       Take 1 capsule (20 mg total) by mouth once daily.    Take 1 capsule (20 mg total) by mouth once daily.    FLUOXETINE 40 MG CAPSULE FLUoxetine 40 MG capsule       Take 1 capsule (40 mg total) by mouth once daily.    Take 1 capsule (40 mg total) by mouth once daily.   Discontinued Medications    DEXTROAMPHETAMINE-AMPHETAMINE (ADDERALL) 20 MG TABLET    Take 1 tablet by mouth once daily.    DEXTROAMPHETAMINE-AMPHETAMINE (ADDERALL) 20 MG TABLET    Take 1 tablet by mouth once daily.      Time spent with pt including note preparation: 45 minutes     Yadi Frank, PhD, MP  Medical Psychologist

## 2024-03-21 ENCOUNTER — OFFICE VISIT (OUTPATIENT)
Dept: PSYCHIATRY | Facility: CLINIC | Age: 29
End: 2024-03-21
Payer: COMMERCIAL

## 2024-03-21 DIAGNOSIS — F41.9 ANXIETY AND DEPRESSION: ICD-10-CM

## 2024-03-21 DIAGNOSIS — F41.1 GAD (GENERALIZED ANXIETY DISORDER): ICD-10-CM

## 2024-03-21 DIAGNOSIS — F33.1 MAJOR DEPRESSIVE DISORDER, RECURRENT EPISODE, MODERATE: ICD-10-CM

## 2024-03-21 DIAGNOSIS — F32.A ANXIETY AND DEPRESSION: ICD-10-CM

## 2024-03-21 DIAGNOSIS — F90.2 ADHD (ATTENTION DEFICIT HYPERACTIVITY DISORDER), COMBINED TYPE: Primary | ICD-10-CM

## 2024-03-21 DIAGNOSIS — F32.5 MAJOR DEPRESSIVE DISORDER WITH SINGLE EPISODE, IN REMISSION: ICD-10-CM

## 2024-03-21 PROCEDURE — 99999 PR PBB SHADOW E&M-EST. PATIENT-LVL II: CPT | Mod: PBBFAC,,, | Performed by: PSYCHOLOGIST

## 2024-03-21 PROCEDURE — 1159F MED LIST DOCD IN RCRD: CPT | Mod: CPTII,S$GLB,, | Performed by: PSYCHOLOGIST

## 2024-03-21 PROCEDURE — 99215 OFFICE O/P EST HI 40 MIN: CPT | Mod: S$GLB,,, | Performed by: PSYCHOLOGIST

## 2024-03-21 RX ORDER — DEXTROAMPHETAMINE SACCHARATE, AMPHETAMINE ASPARTATE, DEXTROAMPHETAMINE SULFATE AND AMPHETAMINE SULFATE 7.5; 7.5; 7.5; 7.5 MG/1; MG/1; MG/1; MG/1
30 TABLET ORAL 2 TIMES DAILY
Qty: 60 TABLET | Refills: 0 | Status: SHIPPED | OUTPATIENT
Start: 2024-05-19

## 2024-03-21 RX ORDER — DEXTROAMPHETAMINE SACCHARATE, AMPHETAMINE ASPARTATE, DEXTROAMPHETAMINE SULFATE AND AMPHETAMINE SULFATE 7.5; 7.5; 7.5; 7.5 MG/1; MG/1; MG/1; MG/1
30 TABLET ORAL 2 TIMES DAILY
Qty: 60 TABLET | Refills: 0 | Status: SHIPPED | OUTPATIENT
Start: 2024-04-20

## 2024-03-21 RX ORDER — FLUOXETINE HYDROCHLORIDE 40 MG/1
40 CAPSULE ORAL DAILY
Qty: 30 CAPSULE | Refills: 2 | Status: SHIPPED | OUTPATIENT
Start: 2024-03-21 | End: 2024-06-19

## 2024-03-21 RX ORDER — FLUOXETINE HYDROCHLORIDE 20 MG/1
20 CAPSULE ORAL DAILY
Qty: 30 CAPSULE | Refills: 2 | Status: SHIPPED | OUTPATIENT
Start: 2024-03-21 | End: 2024-06-19

## 2024-03-21 RX ORDER — DEXTROAMPHETAMINE SACCHARATE, AMPHETAMINE ASPARTATE, DEXTROAMPHETAMINE SULFATE AND AMPHETAMINE SULFATE 7.5; 7.5; 7.5; 7.5 MG/1; MG/1; MG/1; MG/1
30 TABLET ORAL 2 TIMES DAILY
Qty: 60 TABLET | Refills: 0 | Status: SHIPPED | OUTPATIENT
Start: 2024-03-21

## 2024-03-21 RX ORDER — BUSPIRONE HYDROCHLORIDE 15 MG/1
15 TABLET ORAL 2 TIMES DAILY
Qty: 60 TABLET | Refills: 2 | Status: SHIPPED | OUTPATIENT
Start: 2024-03-21 | End: 2024-06-19

## 2024-05-26 ENCOUNTER — OFFICE VISIT (OUTPATIENT)
Dept: URGENT CARE | Facility: CLINIC | Age: 29
End: 2024-05-26
Payer: COMMERCIAL

## 2024-05-26 VITALS
HEART RATE: 104 BPM | SYSTOLIC BLOOD PRESSURE: 130 MMHG | TEMPERATURE: 99 F | BODY MASS INDEX: 37.3 KG/M2 | DIASTOLIC BLOOD PRESSURE: 63 MMHG | WEIGHT: 190 LBS | RESPIRATION RATE: 16 BRPM | OXYGEN SATURATION: 99 % | HEIGHT: 60 IN

## 2024-05-26 DIAGNOSIS — J02.9 ACUTE SORE THROAT: ICD-10-CM

## 2024-05-26 DIAGNOSIS — R52 GENERALIZED BODY ACHES: ICD-10-CM

## 2024-05-26 DIAGNOSIS — J02.0 STREP THROAT: Primary | ICD-10-CM

## 2024-05-26 LAB
CTP QC/QA: YES
MOLECULAR STREP A: POSITIVE

## 2024-05-26 PROCEDURE — 87651 STREP A DNA AMP PROBE: CPT | Mod: QW,S$GLB,, | Performed by: PHYSICIAN ASSISTANT

## 2024-05-26 PROCEDURE — 99214 OFFICE O/P EST MOD 30 MIN: CPT | Mod: S$GLB,,, | Performed by: PHYSICIAN ASSISTANT

## 2024-05-26 RX ORDER — PENICILLIN V POTASSIUM 500 MG/1
500 TABLET, FILM COATED ORAL 2 TIMES DAILY
Qty: 20 TABLET | Refills: 0 | Status: SHIPPED | OUTPATIENT
Start: 2024-05-26 | End: 2024-06-05

## 2024-05-26 NOTE — LETTER
May 26, 2024      [facility]  [address], [address]  [place] [address]  Phone: [phone]       Patient: Elida Ricks   YOB: 1995  Date of Visit: 05/26/2024    To Whom It May Concern:    Jani Ricks  was at [facility] on 05/26/2024. The patient may return to work/school ONCE FEVER FREE FOR 24 HOURS WITHOUT ANY FEVER REDUCING MEDICATION AND IF SYMPTOMS ARE IMPROVING.  If you have any questions or concerns, or if I can be of further assistance, please do not hesitate to contact me.    Sincerely,    Catherine Adan PA-C

## 2024-05-26 NOTE — PROGRESS NOTES
Subjective:      Patient ID: Elida Ricks is a 28 y.o. female.    Vitals:  height is 5' (1.524 m) and weight is 86.2 kg (190 lb). Her tympanic temperature is 98.8 °F (37.1 °C). Her blood pressure is 130/63 and her pulse is 104. Her respiration is 16 and oxygen saturation is 99%.     Chief Complaint: Sore Throat    Patient presents with sore throat, white patches, and body aches that began yesterday. She has been taking OTC ibuprofen.    Sore Throat   This is a new problem. The current episode started yesterday. The problem has been gradually worsening. Neither side of throat is experiencing more pain than the other. There has been no fever. The pain is at a severity of 8/10. Associated symptoms include headaches. Pertinent negatives include no abdominal pain, congestion, coughing, diarrhea, drooling, ear discharge, ear pain, hoarse voice, plugged ear sensation, neck pain, shortness of breath, stridor, swollen glands, trouble swallowing or vomiting. She has had no exposure to strep or mono. She has tried acetaminophen for the symptoms.       Constitution: Negative for fever.   HENT:  Positive for sore throat. Negative for ear pain, ear discharge, drooling, congestion and trouble swallowing.    Neck: Negative for neck pain.   Respiratory:  Negative for cough, shortness of breath and stridor.    Gastrointestinal:  Negative for abdominal pain, vomiting and diarrhea.   Musculoskeletal:  Positive for muscle ache.   Neurological:  Positive for headaches.      Objective:     Vitals:    05/26/24 1300   BP: 130/63   BP Location: Right arm   Patient Position: Sitting   BP Method: X-Large (Automatic)   Pulse: 104   Resp: 16   Temp: 98.8 °F (37.1 °C)   TempSrc: Tympanic   SpO2: 99%   Weight: 86.2 kg (190 lb)   Height: 5' (1.524 m)       Physical Exam   Constitutional: She is oriented to person, place, and time. She appears well-developed. She is cooperative.   HENT:   Head: Normocephalic and atraumatic.   Ears:   Right  Ear: Hearing, tympanic membrane, external ear and ear canal normal.   Left Ear: Hearing, tympanic membrane, external ear and ear canal normal.   Nose: Congestion present. No mucosal edema or nasal deformity. No epistaxis. Right sinus exhibits no maxillary sinus tenderness and no frontal sinus tenderness. Left sinus exhibits no maxillary sinus tenderness and no frontal sinus tenderness.   Mouth/Throat: Uvula is midline and mucous membranes are normal. No trismus in the jaw. Normal dentition. No uvula swelling. Posterior oropharyngeal erythema and cobblestoning present. Tonsils are 2+ on the right. Tonsils are 2+ on the left. Tonsillar exudate.   Eyes: Conjunctivae and lids are normal.   Neck: Trachea normal and phonation normal. Neck supple.   Cardiovascular: Normal rate, regular rhythm, normal heart sounds and normal pulses.   Pulmonary/Chest: Effort normal and breath sounds normal.   Abdominal: Normal appearance and bowel sounds are normal. Soft.   Musculoskeletal: Normal range of motion.         General: Normal range of motion.   Neurological: She is alert and oriented to person, place, and time. She exhibits normal muscle tone.   Skin: Skin is warm, dry and intact.   Psychiatric: Her speech is normal and behavior is normal. Judgment and thought content normal.   Nursing note and vitals reviewed.      Assessment:     1. Strep throat    2. Acute sore throat    3. Generalized body aches      Results for orders placed or performed in visit on 05/26/24   POCT Strep A, Molecular   Result Value Ref Range    Molecular Strep A, POC Positive (A) Negative     Acceptable Yes        Plan:       Strep throat  -     penicillin v potassium (VEETID) 500 MG tablet; Take 1 tablet (500 mg total) by mouth 2 (two) times a day. for 10 days  Dispense: 20 tablet; Refill: 0    Acute sore throat  -     POCT Strep A, Molecular    Generalized body aches          Medical Decision Making:   Clinical Tests:   Lab Tests: Ordered  and Reviewed       <> Summary of Lab: Strep +  Urgent Care Management:  See entire note for further details     Discussed with pt all pertinent UC information and results. Discussed pt dx and plan of tx.   Gave pt all f/u and return to the UC instructions. All questions and concerns were addressed at this time.   Pt expresses understanding of information and instructions, and is comfortable with plan to discharge.   Pt is stable for discharge.     I discussed with patient and/or family/caretaker that evaluation in the UC does not suggest any emergent or life threatening medical conditions requiring immediate intervention beyond what was provided in the UC, and I believe patient is safe for discharge.  Regardless, an unremarkable evaluation in the UC does not preclude the development or presence of a serious or life threatening condition. As such, patient was instructed to GO to ER immediately for any worsening or change in current symptoms.               Patient Instructions   STREP THROAT POSITIVE:    -Take all of your antibiotics as directed.  -Drink plenty fluids  -You will need to purchase a new toothbrush     You may gargle with hot salt water 4 times a day for the next 2 days and then you may also gargle diluted hydrogen peroxide once to twice daily to alleviate some of your throat discomfort.  Drink plenty of fluids, recommend warm tea with honey.     YOU MAY USE OVER-THE-COUNTER CEPACOL FOR SOOTHING OF YOUR THROAT.  You may wish to avoid spicy food, citrus fruits, and red sauces- as this may irritate the throat more.    You can also take a daily anti-histamine such as Zyrtec, Claritin, Xyzal, OR Allegra-IN DAYTIME; NON DROWSY) AND/OR Benadryl- AT NIGHT; DROWSY) to help with runny nose/sneezing/sore throat/cough.    You may alternate over-the-counter Tylenol and ibuprofen as needed for fever and pain, as long as you do not have any contraindications to these medications.    -If your symptoms worsen, you will  need to follow-up with primary care or go to the Emergency Department    If you have been discharged from the clinic prior to your point of care test results being completed, please make sure to check your [facility] account.  If there is a change in treatment, we will communicate with you through here.  If your test is positive, and medications are ordered, these will be sent to your preferred pharmacy.   If your test is negative, no further steps needed. If you do not hear from us or have questions, please call the clinic.      - You must understand that you have received an Urgent Care treatment only and that you may be released before all of your medical problems are known or treated.   - You, the patient, will arrange for follow up care as instructed with your primary care provider or recommended specialist.   - If your condition worsens or fails to improve we recommend that you receive another evaluation at the ER immediately or contact your PCP to discuss your concerns, or return here.   - Please do not drive or make any important decisions for 24 hours if you have received any pain medications, sedatives or mood altering drugs during your visit.    Disclaimer: This document was drafted with the use of a voice recognition device and is likely to have sound alike errors.

## 2024-07-01 ENCOUNTER — OFFICE VISIT (OUTPATIENT)
Dept: PSYCHIATRY | Facility: CLINIC | Age: 29
End: 2024-07-01
Payer: COMMERCIAL

## 2024-07-01 DIAGNOSIS — F90.2 ADHD (ATTENTION DEFICIT HYPERACTIVITY DISORDER), COMBINED TYPE: ICD-10-CM

## 2024-07-01 DIAGNOSIS — F41.1 GAD (GENERALIZED ANXIETY DISORDER): ICD-10-CM

## 2024-07-01 DIAGNOSIS — F32.A ANXIETY AND DEPRESSION: ICD-10-CM

## 2024-07-01 DIAGNOSIS — F33.1 MAJOR DEPRESSIVE DISORDER, RECURRENT EPISODE, MODERATE: ICD-10-CM

## 2024-07-01 DIAGNOSIS — F41.9 ANXIETY AND DEPRESSION: ICD-10-CM

## 2024-07-01 PROCEDURE — 99214 OFFICE O/P EST MOD 30 MIN: CPT | Mod: 95,,, | Performed by: PSYCHOLOGIST

## 2024-07-01 PROCEDURE — 1159F MED LIST DOCD IN RCRD: CPT | Mod: CPTII,95,, | Performed by: PSYCHOLOGIST

## 2024-07-01 RX ORDER — FLUOXETINE HYDROCHLORIDE 20 MG/1
20 CAPSULE ORAL DAILY
Qty: 30 CAPSULE | Refills: 2 | Status: SHIPPED | OUTPATIENT
Start: 2024-07-01 | End: 2024-09-29

## 2024-07-01 RX ORDER — DEXTROAMPHETAMINE SACCHARATE, AMPHETAMINE ASPARTATE, DEXTROAMPHETAMINE SULFATE AND AMPHETAMINE SULFATE 7.5; 7.5; 7.5; 7.5 MG/1; MG/1; MG/1; MG/1
30 TABLET ORAL 2 TIMES DAILY
Qty: 60 TABLET | Refills: 0 | Status: SHIPPED | OUTPATIENT
Start: 2024-07-29

## 2024-07-01 RX ORDER — BUSPIRONE HYDROCHLORIDE 15 MG/1
15 TABLET ORAL 2 TIMES DAILY
Qty: 60 TABLET | Refills: 2 | Status: SHIPPED | OUTPATIENT
Start: 2024-07-01 | End: 2024-09-29

## 2024-07-01 RX ORDER — DEXTROAMPHETAMINE SACCHARATE, AMPHETAMINE ASPARTATE, DEXTROAMPHETAMINE SULFATE AND AMPHETAMINE SULFATE 7.5; 7.5; 7.5; 7.5 MG/1; MG/1; MG/1; MG/1
30 TABLET ORAL 2 TIMES DAILY
Qty: 60 TABLET | Refills: 0 | Status: SHIPPED | OUTPATIENT
Start: 2024-08-27

## 2024-07-01 RX ORDER — FLUOXETINE HYDROCHLORIDE 40 MG/1
40 CAPSULE ORAL DAILY
Qty: 30 CAPSULE | Refills: 2 | Status: SHIPPED | OUTPATIENT
Start: 2024-07-01 | End: 2024-09-29

## 2024-07-01 RX ORDER — DEXTROAMPHETAMINE SACCHARATE, AMPHETAMINE ASPARTATE, DEXTROAMPHETAMINE SULFATE AND AMPHETAMINE SULFATE 7.5; 7.5; 7.5; 7.5 MG/1; MG/1; MG/1; MG/1
30 TABLET ORAL 2 TIMES DAILY
Qty: 60 TABLET | Refills: 0 | Status: SHIPPED | OUTPATIENT
Start: 2024-07-01

## 2024-07-01 NOTE — PROGRESS NOTES
"MEDICATION MANAGEMENT SESSION: VIRTUAL    Name: Elida Ricks  Age: 29 y.o.  : 1995    Preferred Name: Elida    Referring provider: Juliette Cook NP    Site: [place]--via virtual visit with synchronous audio and video. Patient presented at home, in the state [facility].   Each patient to whom medical services by telemedicine is provided is:   (1) informed of the relationship between the physician and patient and the respective role of any other health care provider with respect to management of the patient;   (2) notified that he or she may decline to receive medical services by telemedicine and may withdraw from such care at any time.    Reason for Visit:  Medication follow up    Summary of Visit:  Pt reports doing very well with her medications and requests refills with no new issues to address today. 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 She does not report side effects. She reports "no complaints" and denies depression symptoms. Her anxiety is manageable. Her appetite, energy, and sleep are fine. She describes loving her job working for . Things have not improved at home with .     Current Symptoms:   ADHD: Symptoms managed with Adderall 30 mg bid   Depressive Disorder: tired/fatigued   Anxiety Disorder: anxiety/nervousness   Panic Disorder: denied   Manic Disorder: denied   Psychotic Disorder: denied   Substance Use:  Alcohol: Occasionally      Review of Systems   Constitutional:  Negative for activity change, appetite change, fatigue and unexpected weight change.   Respiratory:  Negative for shortness of breath.    Psychiatric/Behavioral:  Negative for agitation, behavioral problems, confusion, decreased concentration, dysphoric mood, hallucinations, self-injury, sleep disturbance and suicidal ideas. The patient is not nervous/anxious and is not " "hyperactive.       Constitutional:  Vitals:  Most recent vital signs were reviewed.   Last 3 sets of Vitals        8/24/2023    10:12 AM 12/21/2023    10:27 AM 5/26/2024     1:00 PM   Vitals - 1 value per visit   SYSTOLIC 125 153 130   DIASTOLIC 80 95 63   Pulse  90 104   Temp   98.8 °F (37.1 °C)   Resp   16   SPO2   99 %   Weight (lb) 193  190   Weight (kg) 87.544  86.183   Height 5' (1.524 m)  5' (1.524 m)   BMI (Calculated) 37.7  37.1          Psychiatric:  Oriented: x 3   Attitude: cooperative   Eye Contact: good   Behavior: wnl   Mood: "good"  Affect: appropriate range   Attention: intact   Concentration: grossly intact   Thought Process: goal directed   Speech: intelligible  Volume: WNL   Quantity: WNL   Rhythm: WNL  Insight: fair to good   Threats: no SI / HI   Memory: Grossly intact  Psychosis: denies all   Estimate of Intellectual Function: average   Judgment: good  Relevant Elements of Neurological Exam: normal gait     Allergy Review:   Review of patient's allergies indicates:  No Known Allergies     Medical Problem List:   Patient Active Problem List   Diagnosis    Major depressive disorder with single episode, in remission      Encounter Diagnoses   Name Primary?    PRASANNA (generalized anxiety disorder)     Major depressive disorder, recurrent episode, moderate     Anxiety and depression     ADHD (attention deficit hyperactivity disorder), combined type       PLAN:  Medication Management: Continue current medications.    Follow up in about 3 months (around 10/1/2024) for Medication follow up.     Medication List with Changes/Refills   New Medications    DEXTROAMPHETAMINE-AMPHETAMINE (ADDERALL) 30 MG TAB    Take 1 tablet (30 mg total) by mouth 2 (two) times daily.    DEXTROAMPHETAMINE-AMPHETAMINE (ADDERALL) 30 MG TAB    Take 1 tablet (30 mg total) by mouth 2 (two) times daily.    FLUOXETINE 40 MG CAPSULE    Take 1 capsule (40 mg total) by mouth once daily. Take 1 capsule (40 mg) by mouth once daily with " Prozac 20 mg for total daily dose of 60 mg.   Changed and/or Refilled Medications    Modified Medication Previous Medication    BUSPIRONE (BUSPAR) 15 MG TABLET busPIRone (BUSPAR) 15 MG tablet       Take 1 tablet (15 mg total) by mouth 2 (two) times daily.    Take 1 tablet (15 mg total) by mouth 2 (two) times daily.    DEXTROAMPHETAMINE-AMPHETAMINE (ADDERALL) 30 MG TAB dextroamphetamine-amphetamine (ADDERALL) 30 mg Tab       Take 1 tablet (30 mg total) by mouth 2 (two) times daily.    Take 1 tablet (30 mg total) by mouth Daily.    FLUOXETINE 20 MG CAPSULE FLUoxetine 20 MG capsule       Take 1 capsule (20 mg total) by mouth once daily. Take with Prozac 40 mg by mouth once daily for total daily dose of 60 mg.    Take 1 capsule (20 mg total) by mouth once daily.    NORGESTIMATE-ETHINYL ESTRADIOL (ORTHO-CYCLEN) 0.25-35 MG-MCG PER TABLET norgestimate-ethinyl estradioL (ORTHO-CYCLEN) 0.25-35 mg-mcg per tablet       Take 1 tablet by mouth once daily.    Take 1 tablet by mouth once daily.   Discontinued Medications    DEXTROAMPHETAMINE-AMPHETAMINE (ADDERALL) 30 MG TAB    Take 1 tablet (30 mg total) by mouth 2 (two) times a day.    DEXTROAMPHETAMINE-AMPHETAMINE (ADDERALL) 30 MG TAB    Take 1 tablet (30 mg total) by mouth 2 (two) times a day.    DEXTROAMPHETAMINE-AMPHETAMINE (ADDERALL) 30 MG TAB    Take 1 tablet (30 mg total) by mouth 2 (two) times a day.      Time spent with pt including note preparation: 30 minutes     Yadi Frank, PhD, MP  Medical Psychologist

## 2024-10-14 DIAGNOSIS — F90.2 ADHD (ATTENTION DEFICIT HYPERACTIVITY DISORDER), COMBINED TYPE: ICD-10-CM

## 2024-10-16 RX ORDER — DEXTROAMPHETAMINE SACCHARATE, AMPHETAMINE ASPARTATE, DEXTROAMPHETAMINE SULFATE AND AMPHETAMINE SULFATE 7.5; 7.5; 7.5; 7.5 MG/1; MG/1; MG/1; MG/1
30 TABLET ORAL 2 TIMES DAILY
Qty: 60 TABLET | Refills: 0 | Status: SHIPPED | OUTPATIENT
Start: 2024-10-16

## 2024-10-29 ENCOUNTER — OFFICE VISIT (OUTPATIENT)
Dept: PSYCHIATRY | Facility: CLINIC | Age: 29
End: 2024-10-29
Payer: COMMERCIAL

## 2024-10-29 DIAGNOSIS — F41.1 GAD (GENERALIZED ANXIETY DISORDER): ICD-10-CM

## 2024-10-29 DIAGNOSIS — F90.2 ADHD (ATTENTION DEFICIT HYPERACTIVITY DISORDER), COMBINED TYPE: Primary | ICD-10-CM

## 2024-10-29 DIAGNOSIS — F33.1 MAJOR DEPRESSIVE DISORDER, RECURRENT EPISODE, MODERATE: ICD-10-CM

## 2024-10-29 PROCEDURE — 1159F MED LIST DOCD IN RCRD: CPT | Mod: CPTII,95,, | Performed by: PSYCHOLOGIST

## 2024-10-29 PROCEDURE — 99214 OFFICE O/P EST MOD 30 MIN: CPT | Mod: 95,,, | Performed by: PSYCHOLOGIST

## 2024-10-29 RX ORDER — DEXTROAMPHETAMINE SACCHARATE, AMPHETAMINE ASPARTATE, DEXTROAMPHETAMINE SULFATE AND AMPHETAMINE SULFATE 2.5; 2.5; 2.5; 2.5 MG/1; MG/1; MG/1; MG/1
5-10 TABLET ORAL DAILY
Qty: 30 TABLET | Refills: 0 | Status: SHIPPED | OUTPATIENT
Start: 2024-12-24

## 2024-10-29 RX ORDER — DEXTROAMPHETAMINE SACCHARATE, AMPHETAMINE ASPARTATE, DEXTROAMPHETAMINE SULFATE AND AMPHETAMINE SULFATE 2.5; 2.5; 2.5; 2.5 MG/1; MG/1; MG/1; MG/1
5-10 TABLET ORAL DAILY
Qty: 30 TABLET | Refills: 0 | Status: SHIPPED | OUTPATIENT
Start: 2024-10-29 | End: 2024-11-28

## 2024-10-29 RX ORDER — FLUOXETINE HYDROCHLORIDE 40 MG/1
40 CAPSULE ORAL DAILY
Qty: 30 CAPSULE | Refills: 2 | Status: SHIPPED | OUTPATIENT
Start: 2024-10-29 | End: 2025-01-27

## 2024-10-29 RX ORDER — DEXTROAMPHETAMINE SACCHARATE, AMPHETAMINE ASPARTATE, DEXTROAMPHETAMINE SULFATE AND AMPHETAMINE SULFATE 2.5; 2.5; 2.5; 2.5 MG/1; MG/1; MG/1; MG/1
5-10 TABLET ORAL DAILY
Qty: 30 TABLET | Refills: 0 | Status: SHIPPED | OUTPATIENT
Start: 2024-11-27

## 2024-10-29 RX ORDER — DEXTROAMPHETAMINE SACCHARATE, AMPHETAMINE ASPARTATE, DEXTROAMPHETAMINE SULFATE AND AMPHETAMINE SULFATE 7.5; 7.5; 7.5; 7.5 MG/1; MG/1; MG/1; MG/1
30 TABLET ORAL 2 TIMES DAILY
Qty: 60 TABLET | Refills: 0 | Status: SHIPPED | OUTPATIENT
Start: 2024-12-24

## 2024-10-29 RX ORDER — DEXTROAMPHETAMINE SACCHARATE, AMPHETAMINE ASPARTATE, DEXTROAMPHETAMINE SULFATE AND AMPHETAMINE SULFATE 7.5; 7.5; 7.5; 7.5 MG/1; MG/1; MG/1; MG/1
30 TABLET ORAL 2 TIMES DAILY
Qty: 60 TABLET | Refills: 0 | Status: SHIPPED | OUTPATIENT
Start: 2024-11-27

## 2024-10-29 RX ORDER — DEXTROAMPHETAMINE SACCHARATE, AMPHETAMINE ASPARTATE, DEXTROAMPHETAMINE SULFATE AND AMPHETAMINE SULFATE 7.5; 7.5; 7.5; 7.5 MG/1; MG/1; MG/1; MG/1
30 TABLET ORAL 2 TIMES DAILY
Qty: 60 TABLET | Refills: 0 | Status: SHIPPED | OUTPATIENT
Start: 2024-10-29

## 2024-10-29 RX ORDER — FLUOXETINE HYDROCHLORIDE 20 MG/1
20 CAPSULE ORAL DAILY
Qty: 30 CAPSULE | Refills: 2 | Status: SHIPPED | OUTPATIENT
Start: 2024-10-29 | End: 2025-01-27

## 2024-11-09 ENCOUNTER — OFFICE VISIT (OUTPATIENT)
Dept: URGENT CARE | Facility: CLINIC | Age: 29
End: 2024-11-09
Payer: COMMERCIAL

## 2024-11-09 VITALS
TEMPERATURE: 99 F | DIASTOLIC BLOOD PRESSURE: 86 MMHG | OXYGEN SATURATION: 99 % | HEART RATE: 132 BPM | HEIGHT: 60 IN | SYSTOLIC BLOOD PRESSURE: 132 MMHG | RESPIRATION RATE: 20 BRPM | WEIGHT: 200 LBS | BODY MASS INDEX: 39.27 KG/M2

## 2024-11-09 DIAGNOSIS — J03.00 STREP TONSILLITIS: Primary | ICD-10-CM

## 2024-11-09 DIAGNOSIS — R52 GENERALIZED BODY ACHES: ICD-10-CM

## 2024-11-09 DIAGNOSIS — J02.9 ACUTE SORE THROAT: ICD-10-CM

## 2024-11-09 LAB
CTP QC/QA: YES
CTP QC/QA: YES
MOLECULAR STREP A: POSITIVE
POC MOLECULAR INFLUENZA A AGN: NEGATIVE
POC MOLECULAR INFLUENZA B AGN: NEGATIVE

## 2024-11-09 PROCEDURE — 87502 INFLUENZA DNA AMP PROBE: CPT | Mod: QW,S$GLB,, | Performed by: PHYSICIAN ASSISTANT

## 2024-11-09 PROCEDURE — 96372 THER/PROPH/DIAG INJ SC/IM: CPT | Mod: S$GLB,,, | Performed by: EMERGENCY MEDICINE

## 2024-11-09 PROCEDURE — 87651 STREP A DNA AMP PROBE: CPT | Mod: QW,S$GLB,, | Performed by: PHYSICIAN ASSISTANT

## 2024-11-09 PROCEDURE — 99214 OFFICE O/P EST MOD 30 MIN: CPT | Mod: 25,S$GLB,, | Performed by: PHYSICIAN ASSISTANT

## 2024-11-09 RX ORDER — CEFTRIAXONE 1 G/1
1 INJECTION, POWDER, FOR SOLUTION INTRAMUSCULAR; INTRAVENOUS
Status: COMPLETED | OUTPATIENT
Start: 2024-11-09 | End: 2024-11-09

## 2024-11-09 RX ORDER — AMOXICILLIN AND CLAVULANATE POTASSIUM 875; 125 MG/1; MG/1
1 TABLET, FILM COATED ORAL EVERY 12 HOURS
Qty: 20 TABLET | Refills: 0 | Status: SHIPPED | OUTPATIENT
Start: 2024-11-10 | End: 2024-11-20

## 2024-11-09 RX ORDER — LIDOCAINE HYDROCHLORIDE 10 MG/ML
2.1 INJECTION, SOLUTION INFILTRATION; PERINEURAL
Status: COMPLETED | OUTPATIENT
Start: 2024-11-09 | End: 2024-11-09

## 2024-11-09 RX ORDER — CEFTRIAXONE 500 MG/1
1 INJECTION, POWDER, FOR SOLUTION INTRAMUSCULAR; INTRAVENOUS ONCE
Status: DISCONTINUED | OUTPATIENT
Start: 2024-11-09 | End: 2024-11-09

## 2024-11-09 RX ADMIN — LIDOCAINE HYDROCHLORIDE 2.1 ML: 10 INJECTION, SOLUTION INFILTRATION; PERINEURAL at 05:11

## 2024-11-09 RX ADMIN — CEFTRIAXONE 1 G: 1 INJECTION, POWDER, FOR SOLUTION INTRAMUSCULAR; INTRAVENOUS at 05:11

## 2024-11-09 NOTE — PATIENT INSTRUCTIONS
STREP THROAT POSITIVE:    -Take all of your antibiotics as directed.  -Drink plenty fluids  -You will need to purchase a new toothbrush     You may gargle with hot salt water 4 times a day for the next 2 days and then you may also gargle diluted hydrogen peroxide once to twice daily to alleviate some of your throat discomfort.  Drink plenty of fluids, recommend warm tea with honey.     YOU MAY USE OVER-THE-COUNTER CEPACOL FOR SOOTHING OF YOUR THROAT.  You may wish to avoid spicy food, citrus fruits, and red sauces- as this may irritate the throat more.    You can also take a daily anti-histamine such as Zyrtec, Claritin, Xyzal, OR Allegra-IN DAYTIME; NON DROWSY) AND/OR Benadryl- AT NIGHT; DROWSY) to help with runny nose/sneezing/sore throat/cough.    You may alternate over-the-counter Tylenol and ibuprofen as needed for fever and pain, as long as you do not have any contraindications to these medications.    -If your symptoms worsen, you will need to follow-up with primary care or go to the Emergency Department      If you have been discharged from the clinic prior to your point of care test results being completed, please make sure to check your [employer] account.  If there is a change in treatment, we will communicate with you through here.  If your test is positive, and medications are ordered, these will be sent to your preferred pharmacy.   If your test is negative, no further steps needed. If you do not hear from us or have questions, please call the clinic.      - You must understand that you have received an Urgent Care treatment only and that you may be released before all of your medical problems are known or treated.   - You, the patient, will arrange for follow up care as instructed with your primary care provider or recommended specialist.   - If your condition worsens or fails to improve we recommend that you receive another evaluation at the ER immediately or contact your PCP to discuss your concerns,  or return here.   - Please do not drive or make any important decisions for 24 hours if you have received any pain medications, sedatives or mood altering drugs during your visit.    Disclaimer: This document was drafted with the use of a voice recognition device and is likely to have sound alike errors.

## 2024-11-09 NOTE — PROGRESS NOTES
Subjective:      Patient ID: Elida Ricks is a 29 y.o. female.    Vitals:  height is 5' (1.524 m) and weight is 90.7 kg (200 lb). Her oral temperature is 99.3 °F (37.4 °C). Her blood pressure is 132/86 and her pulse is 132 (abnormal). Her respiration is 20 and oxygen saturation is 99%.     Chief Complaint: Sore Throat    Pt reports sore throat since yesterday. Reports chills and body aches as well.     Sore Throat   This is a new problem. The current episode started yesterday. The pain is worse on the left side. The pain is at a severity of 9/10. The pain is severe. Associated symptoms include headaches. Pertinent negatives include no abdominal pain, congestion, coughing, diarrhea, drooling, ear discharge, ear pain, hoarse voice, plugged ear sensation, neck pain, shortness of breath, stridor, swollen glands, trouble swallowing or vomiting. Treatments tried: advil. The treatment provided mild relief.       Constitution: Positive for fever.   HENT:  Positive for sore throat. Negative for ear pain, ear discharge, drooling, congestion and trouble swallowing.    Neck: Negative for neck pain.   Respiratory:  Negative for cough, shortness of breath and stridor.    Gastrointestinal:  Negative for abdominal pain, vomiting and diarrhea.   Musculoskeletal:  Positive for muscle ache.   Neurological:  Positive for headaches.      Objective:     Vitals:    11/09/24 1639   BP: 132/86   Pulse: (!) 132   Resp: 20   Temp: 99.3 °F (37.4 °C)     Physical Exam   Constitutional: She is oriented to person, place, and time. She appears well-developed. She is cooperative.   HENT:   Head: Normocephalic and atraumatic.   Ears:   Right Ear: Hearing, tympanic membrane, external ear and ear canal normal.   Left Ear: Hearing, tympanic membrane, external ear and ear canal normal.   Nose: Nose normal. No mucosal edema or nasal deformity. No epistaxis. Right sinus exhibits no maxillary sinus tenderness and no frontal sinus tenderness. Left  sinus exhibits no maxillary sinus tenderness and no frontal sinus tenderness.   Mouth/Throat: Uvula is midline and mucous membranes are normal. No trismus in the jaw. Normal dentition. No uvula swelling. Posterior oropharyngeal edema and posterior oropharyngeal erythema present. No tonsillar abscesses. Tonsils are 3+ on the right. Tonsils are 3+ on the left. Tonsillar exudate.   Speaking in complete sentences   No tripoding   Maintaining secretions        Comments: Speaking in complete sentences   No tripoding   Maintaining secretions    Eyes: Conjunctivae and lids are normal.   Neck: Trachea normal and phonation normal. Neck supple.   Cardiovascular: Normal rate, regular rhythm, normal heart sounds and normal pulses.   Pulmonary/Chest: Effort normal and breath sounds normal.   Abdominal: Normal appearance and bowel sounds are normal. Soft.   Musculoskeletal: Normal range of motion.         General: Normal range of motion.   Neurological: She is alert and oriented to person, place, and time. She exhibits normal muscle tone.   Skin: Skin is warm, dry and intact.   Psychiatric: Her speech is normal and behavior is normal. Judgment and thought content normal.   Nursing note and vitals reviewed.      Assessment:     1. Strep tonsillitis    2. Acute sore throat    3. Generalized body aches      Results for orders placed or performed in visit on 11/09/24   POCT Strep A, Molecular    Collection Time: 11/09/24  4:53 PM   Result Value Ref Range    Molecular Strep A, POC Positive (A) Negative     Acceptable Yes        Plan:       Strep tonsillitis  -     cefTRIAXone injection 1 g  -     LIDOcaine HCL 10 mg/ml (1%) injection 2.1 mL  -     amoxicillin-clavulanate 875-125mg (AUGMENTIN) 875-125 mg per tablet; Take 1 tablet by mouth every 12 (twelve) hours. for 10 days  Dispense: 20 tablet; Refill: 0    Acute sore throat  -     POCT Strep A, Molecular    Generalized body aches  -     POCT Influenza A/B  Molecular          Medical Decision Making:   Clinical Tests:   Lab Tests: Ordered and Reviewed           Patient Instructions   STREP THROAT POSITIVE:    -Take all of your antibiotics as directed.  -Drink plenty fluids  -You will need to purchase a new toothbrush     You may gargle with hot salt water 4 times a day for the next 2 days and then you may also gargle diluted hydrogen peroxide once to twice daily to alleviate some of your throat discomfort.  Drink plenty of fluids, recommend warm tea with honey.     YOU MAY USE OVER-THE-COUNTER CEPACOL FOR SOOTHING OF YOUR THROAT.  You may wish to avoid spicy food, citrus fruits, and red sauces- as this may irritate the throat more.    You can also take a daily anti-histamine such as Zyrtec, Claritin, Xyzal, OR Allegra-IN DAYTIME; NON DROWSY) AND/OR Benadryl- AT NIGHT; DROWSY) to help with runny nose/sneezing/sore throat/cough.    You may alternate over-the-counter Tylenol and ibuprofen as needed for fever and pain, as long as you do not have any contraindications to these medications.    -If your symptoms worsen, you will need to follow-up with primary care or go to the Emergency Department      If you have been discharged from the clinic prior to your point of care test results being completed, please make sure to check your [facility] account.  If there is a change in treatment, we will communicate with you through here.  If your test is positive, and medications are ordered, these will be sent to your preferred pharmacy.   If your test is negative, no further steps needed. If you do not hear from us or have questions, please call the clinic.      - You must understand that you have received an Urgent Care treatment only and that you may be released before all of your medical problems are known or treated.   - You, the patient, will arrange for follow up care as instructed with your primary care provider or recommended specialist.   - If your condition worsens or fails  to improve we recommend that you receive another evaluation at the ER immediately or contact your PCP to discuss your concerns, or return here.   - Please do not drive or make any important decisions for 24 hours if you have received any pain medications, sedatives or mood altering drugs during your visit.    Disclaimer: This document was drafted with the use of a voice recognition device and is likely to have sound alike errors.

## 2024-11-10 ENCOUNTER — TELEPHONE (OUTPATIENT)
Dept: URGENT CARE | Facility: CLINIC | Age: 29
End: 2024-11-10
Payer: COMMERCIAL

## 2024-12-23 NOTE — PROGRESS NOTES
Duplicate   MEDICATION MANAGEMENT SESSION    Name: Elida Ricks  Age: 28 y.o.  : 1995    Preferred Name: Elida    Referring provider: Juliette Cook NP    Reason for Visit:  Medication follow up    Summary of Visit:  Pt reports her mood has been stable though she is unhappy in her marriage and considers leaving and shared custody of children with her . She has lost 40 lbs since last visit, is exercising daily, and feels good about changes she's made to improve her life. Her  says they can't afford marital counseling. Her ADHD symptoms are managed some days with Adderall 20 mg bid but most days, she becomes unfocused and distracted in early afternoon. She is sleeping fine; her appetite is normal. Pt has resigned from  position and now works full time for [employer]; this was a good decision for her, she is enjoying it.    Current Symptoms:   ADHD: inattentive, easily distracted   Depressive Disorder: tired/fatigued   Anxiety Disorder: anxiety/nervousness   Panic Disorder: denied   Manic Disorder: denied   Psychotic Disorder: denied   Substance Use:  Alcohol: Occasionally      Review of Systems   Constitutional:  Positive for fatigue. Negative for activity change, appetite change and unexpected weight change.   Respiratory:  Negative for shortness of breath.    Psychiatric/Behavioral:  Positive for decreased concentration. Negative for agitation, behavioral problems, confusion, dysphoric mood, hallucinations, self-injury, sleep disturbance and suicidal ideas. The patient is not hyperactive.       Constitutional:  Vitals:  Most recent vital signs were reviewed.   Last 3 sets of Vitals        2023    10:31 AM 2023    10:12 AM 2023    10:27 AM   Vitals - 1 value per visit   SYSTOLIC 123 125 153   DIASTOLIC 71 80 95   Pulse 69  90   Weight (lb)  193    Weight (kg)  87.544    Height  5' (1.524 m)    BMI (Calculated)  37.7           Psychiatric:  Oriented: x 3   Attitude: cooperative  "  Eye Contact: good   Behavior: wnl   Mood: "good"  Affect: appropriate range   Attention: intact   Concentration: grossly intact   Thought Process: goal directed   Speech: intelligible  Volume: WNL   Quantity: WNL   Rhythm: WNL  Insight: fair to good   Threats: no SI / HI   Memory: Grossly intact  Psychosis: denies all   Estimate of Intellectual Function: average   Judgment: good   Relevant Elements of Neurological Exam: normal gait     Allergy Review:   Review of patient's allergies indicates:  No Known Allergies     Medical Problem List:   Patient Active Problem List   Diagnosis    Major depressive disorder with single episode, in remission      Encounter Diagnoses   Name Primary?    ADHD (attention deficit hyperactivity disorder), combined type     Major depressive disorder with single episode, in remission     PRASANNA (generalized anxiety disorder) Yes    Anxiety and depression     Major depressive disorder, recurrent episode, moderate       PLAN:  Medication Management: Continue current medications. Discussed risks, benefits, and alternatives to treatment plan documented above with patient. I answered all patient questions related to this plan, and patient expressed understanding and agreement.      Follow up in about 3 months (around 3/21/2024) for Medication follow up.     Medication List with Changes/Refills   New Medications    DEXTROAMPHETAMINE-AMPHETAMINE (ADDERALL) 20 MG TABLET    Take 1 tablet by mouth once daily.    DEXTROAMPHETAMINE-AMPHETAMINE (ADDERALL) 20 MG TABLET    Take 1 tablet by mouth once daily.    DEXTROAMPHETAMINE-AMPHETAMINE (ADDERALL) 30 MG TAB    Take 1 tablet (30 mg total) by mouth Daily.    DEXTROAMPHETAMINE-AMPHETAMINE (ADDERALL) 30 MG TAB    Take 1 tablet (30 mg total) by mouth Daily.    DEXTROAMPHETAMINE-AMPHETAMINE 30 MG TAB    Take 1 tablet (30 mg total) by mouth Daily.   Current Medications    NORGESTIMATE-ETHINYL ESTRADIOL (ORTHO-CYCLEN) 0.25-35 MG-MCG PER TABLET    Take 1 tablet " by mouth once daily.   Changed and/or Refilled Medications    Modified Medication Previous Medication    BUSPIRONE (BUSPAR) 15 MG TABLET busPIRone (BUSPAR) 15 MG tablet       Take 1 tablet (15 mg total) by mouth 2 (two) times daily.    Take 1 tablet (15 mg total) by mouth 2 (two) times daily.    DEXTROAMPHETAMINE-AMPHETAMINE (ADDERALL) 20 MG TABLET dextroamphetamine-amphetamine (ADDERALL) 20 mg tablet       Take 1 tablet by mouth once daily.    Take 1 tablet by mouth 2 (two) times a day.    FLUOXETINE 20 MG CAPSULE FLUoxetine 20 MG capsule       Take 1 capsule (20 mg total) by mouth once daily.    Take 1 capsule (20 mg total) by mouth once daily.    FLUOXETINE 40 MG CAPSULE FLUoxetine 40 MG capsule       Take 1 capsule (40 mg total) by mouth once daily.    Take 1 capsule (40 mg total) by mouth once daily.      Time spent with pt including note preparation: 30 minutes     Yadi Frank, PhD, MP  Medical Psychologist

## 2025-01-15 DIAGNOSIS — F41.1 GAD (GENERALIZED ANXIETY DISORDER): ICD-10-CM

## 2025-01-15 DIAGNOSIS — F41.9 ANXIETY AND DEPRESSION: ICD-10-CM

## 2025-01-15 DIAGNOSIS — F32.A ANXIETY AND DEPRESSION: ICD-10-CM

## 2025-01-15 RX ORDER — BUSPIRONE HYDROCHLORIDE 15 MG/1
15 TABLET ORAL 2 TIMES DAILY
Qty: 60 TABLET | Refills: 2 | Status: SHIPPED | OUTPATIENT
Start: 2025-01-15 | End: 2025-01-27 | Stop reason: SDUPTHER

## 2025-01-27 ENCOUNTER — PATIENT MESSAGE (OUTPATIENT)
Dept: PSYCHIATRY | Facility: CLINIC | Age: 30
End: 2025-01-27
Payer: COMMERCIAL

## 2025-01-27 ENCOUNTER — OFFICE VISIT (OUTPATIENT)
Dept: PSYCHIATRY | Facility: CLINIC | Age: 30
End: 2025-01-27
Payer: COMMERCIAL

## 2025-01-27 DIAGNOSIS — F33.1 MAJOR DEPRESSIVE DISORDER, RECURRENT EPISODE, MODERATE: ICD-10-CM

## 2025-01-27 DIAGNOSIS — F90.2 ADHD (ATTENTION DEFICIT HYPERACTIVITY DISORDER), COMBINED TYPE: ICD-10-CM

## 2025-01-27 DIAGNOSIS — F41.1 GAD (GENERALIZED ANXIETY DISORDER): ICD-10-CM

## 2025-01-27 DIAGNOSIS — F41.9 ANXIETY AND DEPRESSION: ICD-10-CM

## 2025-01-27 DIAGNOSIS — F90.2 ADHD (ATTENTION DEFICIT HYPERACTIVITY DISORDER), COMBINED TYPE: Primary | ICD-10-CM

## 2025-01-27 DIAGNOSIS — F32.A ANXIETY AND DEPRESSION: ICD-10-CM

## 2025-01-27 PROCEDURE — 99499 UNLISTED E&M SERVICE: CPT | Mod: 95,,, | Performed by: PSYCHOLOGIST

## 2025-01-27 RX ORDER — FLUOXETINE HYDROCHLORIDE 40 MG/1
40 CAPSULE ORAL DAILY
Qty: 30 CAPSULE | Refills: 2 | Status: SHIPPED | OUTPATIENT
Start: 2025-01-27 | End: 2025-04-27

## 2025-01-27 RX ORDER — BUSPIRONE HYDROCHLORIDE 15 MG/1
15 TABLET ORAL 2 TIMES DAILY
Qty: 60 TABLET | Refills: 2 | Status: SHIPPED | OUTPATIENT
Start: 2025-01-27 | End: 2025-04-27

## 2025-01-27 RX ORDER — FLUOXETINE HYDROCHLORIDE 20 MG/1
20 CAPSULE ORAL DAILY
Qty: 30 CAPSULE | Refills: 2 | Status: SHIPPED | OUTPATIENT
Start: 2025-01-27 | End: 2025-04-27

## 2025-01-27 RX ORDER — DEXTROAMPHETAMINE SACCHARATE, AMPHETAMINE ASPARTATE, DEXTROAMPHETAMINE SULFATE AND AMPHETAMINE SULFATE 2.5; 2.5; 2.5; 2.5 MG/1; MG/1; MG/1; MG/1
5-10 TABLET ORAL DAILY
Qty: 30 TABLET | Refills: 0 | Status: SHIPPED | OUTPATIENT
Start: 2025-01-27

## 2025-01-27 NOTE — PROGRESS NOTES
Pt connected for visit 20 minutes late. Provider sent refills until she can reschedule. There is no level of service.

## 2025-02-19 ENCOUNTER — OFFICE VISIT (OUTPATIENT)
Dept: PSYCHIATRY | Facility: CLINIC | Age: 30
End: 2025-02-19
Payer: COMMERCIAL

## 2025-02-19 DIAGNOSIS — F33.1 MAJOR DEPRESSIVE DISORDER, RECURRENT EPISODE, MODERATE: ICD-10-CM

## 2025-02-19 DIAGNOSIS — F32.A ANXIETY AND DEPRESSION: ICD-10-CM

## 2025-02-19 DIAGNOSIS — F90.2 ADHD (ATTENTION DEFICIT HYPERACTIVITY DISORDER), COMBINED TYPE: Primary | ICD-10-CM

## 2025-02-19 DIAGNOSIS — F41.1 GAD (GENERALIZED ANXIETY DISORDER): ICD-10-CM

## 2025-02-19 DIAGNOSIS — F41.9 ANXIETY AND DEPRESSION: ICD-10-CM

## 2025-02-19 RX ORDER — BUSPIRONE HYDROCHLORIDE 15 MG/1
15 TABLET ORAL 2 TIMES DAILY
Qty: 180 TABLET | Refills: 1 | Status: SHIPPED | OUTPATIENT
Start: 2025-02-19 | End: 2025-08-18

## 2025-02-19 RX ORDER — DEXTROAMPHETAMINE SACCHARATE, AMPHETAMINE ASPARTATE, DEXTROAMPHETAMINE SULFATE AND AMPHETAMINE SULFATE 7.5; 7.5; 7.5; 7.5 MG/1; MG/1; MG/1; MG/1
30 TABLET ORAL 2 TIMES DAILY
Qty: 60 TABLET | Refills: 0 | Status: SHIPPED | OUTPATIENT
Start: 2025-03-17

## 2025-02-19 RX ORDER — FLUOXETINE HYDROCHLORIDE 20 MG/1
20 CAPSULE ORAL DAILY
Qty: 90 CAPSULE | Refills: 1 | Status: SHIPPED | OUTPATIENT
Start: 2025-02-19 | End: 2025-08-18

## 2025-02-19 RX ORDER — DEXTROAMPHETAMINE SACCHARATE, AMPHETAMINE ASPARTATE, DEXTROAMPHETAMINE SULFATE AND AMPHETAMINE SULFATE 7.5; 7.5; 7.5; 7.5 MG/1; MG/1; MG/1; MG/1
30 TABLET ORAL 2 TIMES DAILY
Qty: 60 TABLET | Refills: 0 | Status: SHIPPED | OUTPATIENT
Start: 2025-02-19

## 2025-02-19 RX ORDER — DEXTROAMPHETAMINE SACCHARATE, AMPHETAMINE ASPARTATE, DEXTROAMPHETAMINE SULFATE AND AMPHETAMINE SULFATE 7.5; 7.5; 7.5; 7.5 MG/1; MG/1; MG/1; MG/1
30 TABLET ORAL 2 TIMES DAILY
Qty: 60 TABLET | Refills: 0 | Status: SHIPPED | OUTPATIENT
Start: 2025-04-15

## 2025-02-19 RX ORDER — DEXTROAMPHETAMINE SACCHARATE, AMPHETAMINE ASPARTATE, DEXTROAMPHETAMINE SULFATE AND AMPHETAMINE SULFATE 2.5; 2.5; 2.5; 2.5 MG/1; MG/1; MG/1; MG/1
5-10 TABLET ORAL DAILY
Qty: 30 TABLET | Refills: 0 | Status: SHIPPED | OUTPATIENT
Start: 2025-03-17

## 2025-02-19 RX ORDER — DEXTROAMPHETAMINE SACCHARATE, AMPHETAMINE ASPARTATE, DEXTROAMPHETAMINE SULFATE AND AMPHETAMINE SULFATE 2.5; 2.5; 2.5; 2.5 MG/1; MG/1; MG/1; MG/1
5-10 TABLET ORAL DAILY
Qty: 30 TABLET | Refills: 0 | Status: SHIPPED | OUTPATIENT
Start: 2025-02-19

## 2025-02-19 RX ORDER — DEXTROAMPHETAMINE SACCHARATE, AMPHETAMINE ASPARTATE, DEXTROAMPHETAMINE SULFATE AND AMPHETAMINE SULFATE 2.5; 2.5; 2.5; 2.5 MG/1; MG/1; MG/1; MG/1
5-10 TABLET ORAL DAILY
Qty: 30 TABLET | Refills: 0 | Status: SHIPPED | OUTPATIENT
Start: 2025-04-15

## 2025-02-19 RX ORDER — FLUOXETINE HYDROCHLORIDE 40 MG/1
40 CAPSULE ORAL DAILY
Qty: 90 CAPSULE | Refills: 1 | Status: SHIPPED | OUTPATIENT
Start: 2025-02-19 | End: 2025-08-18

## 2025-02-19 NOTE — PROGRESS NOTES
MEDICATION MANAGEMENT SESSION: VIRTUAL    Name: Elida Ricks  Age: 29 y.o.  : 1995    Preferred Name: Elida    Site: [facility]--via virtual visit with synchronous audio and video. Patient presented at home, in the state [facility].   Each patient to whom medical services by telemedicine is provided is:   (1) informed of the relationship between the physician and patient and the respective role of any other health care provider with respect to management of the patient;   (2) notified that he or she may decline to receive medical services by telemedicine and may withdraw from such care at any time.     Referring provider: Juliette Cook NP     Reason for Visit:  Medication follow up     LAST VISIT 10/29/24:  Pt continues to do well with Prozac 60 mg qd and reports no depression symptoms, has good energy level, and sleeping fine. Buspar 15 mg bid  manages her anxiety and worry. She is recently  from her  and living on her own; she reports being much happier. They share custody of daughter. She states that Adderall 30 mg bid is not lasting long enough into the afternoon at work, so she waits to take it later but has late morning/early afternoon fatigue, poor focus. Discussed adding Adderall 10 mg prn to take 35 mg bid, or 10 mg prn in afternoon. She reports no side effects.     ADHD: inattentive, easily distracted   Depressive Disorder: denied   Anxiety Disorder: denied   Panic Disorder: denied   Manic Disorder: denied   Psychotic Disorder: denied   Substance Use:  denied     CURRENT VISIT:  Pt connected for visit 20 minutes late on 25. Provider sent refills until she can reschedule.     She is here today for 3-month follow up; she has no new problems to address at this time.   Pt continues to do well with Prozac 60 mg qd and reports no depression symptoms, has good energy level, and sleeping fine. Buspar 15 mg bid  manages her anxiety and worry, which has increased since she is now  "a single parent post-divorce; shares custody of children with ex. She's happier at this time and regularly attending therapy, working on assertiveness skills.    Increase to Adderall 35 mg bid since last appointment is addressing her ADHD symptoms on days she works long hours; reports no side effects.    PLAN:  Continue Prozac 60 mg qd;   Continue Buspar 15 mg bid;  Continue Adderall 30-35 mg bid;    RTC in 3 months.    Current symptoms:   ADHD: Symptoms managed with Adderall 35 mg bid   Depressive Disorder: denied   Anxiety Disorder: worry, bit more anxiety now as single parent    Panic Disorder: denied   Manic Disorder: denied   Psychotic Disorder: denied   Substance Use:  denied     Review of Systems   Constitutional:  Positive for fatigue. Negative for activity change, appetite change and unexpected weight change.   Respiratory:  Negative for shortness of breath.    Psychiatric/Behavioral:  Negative for agitation, behavioral problems, confusion, decreased concentration, dysphoric mood, hallucinations, self-injury, sleep disturbance and suicidal ideas. The patient is nervous/anxious. The patient is not hyperactive.       Constitutional:  Vitals:  Most recent vital signs were reviewed.   Last 3 sets of Vitals        5/26/2024     1:00 PM 8/27/2024     9:04 AM 11/9/2024     4:39 PM   Vitals - 1 value per visit   SYSTOLIC 130 122 132   DIASTOLIC 63 80 86   Pulse 104  132   Temp 98.8 °F (37.1 °C)  99.3 °F (37.4 °C)   Resp 16  20   SPO2 99 %  99 %   Weight (lb) 190 204 200   Weight (kg) 86.183 92.534 90.719   Height 5' (1.524 m) 5' (1.524 m) 5' (1.524 m)   BMI (Calculated) 37.1 39.8 39.1   Pain Score   Nine          Psychiatric:  Oriented: x 3   Attitude: cooperative   Eye Contact: good   Behavior: wnl   Mood: "good"  Affect: appropriate range   Attention: intact   Concentration: grossly intact   Thought Process: goal directed   Speech: intelligible  Volume: WNL   Quantity: WNL   Rhythm: WNL  Insight: fair to good "   Threats: no SI / HI   Memory: Grossly intact  Psychosis: denies all   Estimate of Intellectual Function: average   Judgment:  good  Relevant Elements of Neurological Exam: normal gait     Allergy Review:   Review of patient's allergies indicates:  No Known Allergies     Medical Problem List:   Problem List[1]     Encounter Diagnoses   Name Primary?    PRASANNA (generalized anxiety disorder) Yes    ADHD (attention deficit hyperactivity disorder), combined type     Major depressive disorder, recurrent episode, moderate       PLAN:  Medication Management: Continue current medications.    Follow up in about 3 months (around 5/19/2025) for Medication follow up.     Medication List with Changes/Refills   Current Medications    BUSPIRONE (BUSPAR) 15 MG TABLET    Take 1 tablet (15 mg total) by mouth 2 (two) times daily.    DEXTROAMPHETAMINE-AMPHETAMINE (ADDERALL) 10 MG TAB    Take 0.5-1 tablets (5-10 mg total) by mouth Daily.    FLUOXETINE 20 MG CAPSULE    Take 1 capsule (20 mg total) by mouth once daily.    FLUOXETINE 40 MG CAPSULE    Take 1 capsule (40 mg total) by mouth once daily. Take 1 capsule (40 mg) by mouth once daily with Prozac 20 mg for total daily dose of 60 mg.    NORGESTIMATE-ETHINYL ESTRADIOL (ORTHO-CYCLEN) 0.25-35 MG-MCG PER TABLET    Take 1 tablet by mouth once daily.      Time spent with pt including note preparation: 25 minutes     Yadi Frank, PhD, MP  Medical Psychologist         [1]   Patient Active Problem List  Diagnosis    Major depressive disorder with single episode, in remission

## 2025-05-21 ENCOUNTER — OFFICE VISIT (OUTPATIENT)
Dept: PSYCHIATRY | Facility: CLINIC | Age: 30
End: 2025-05-21
Payer: COMMERCIAL

## 2025-05-21 DIAGNOSIS — F41.9 ANXIETY AND DEPRESSION: ICD-10-CM

## 2025-05-21 DIAGNOSIS — F32.A ANXIETY AND DEPRESSION: ICD-10-CM

## 2025-05-21 DIAGNOSIS — F90.2 ADHD (ATTENTION DEFICIT HYPERACTIVITY DISORDER), COMBINED TYPE: ICD-10-CM

## 2025-05-21 DIAGNOSIS — F33.1 MAJOR DEPRESSIVE DISORDER, RECURRENT EPISODE, MODERATE: ICD-10-CM

## 2025-05-21 DIAGNOSIS — F41.1 GAD (GENERALIZED ANXIETY DISORDER): Primary | ICD-10-CM

## 2025-05-21 RX ORDER — DEXTROAMPHETAMINE SACCHARATE, AMPHETAMINE ASPARTATE, DEXTROAMPHETAMINE SULFATE AND AMPHETAMINE SULFATE 2.5; 2.5; 2.5; 2.5 MG/1; MG/1; MG/1; MG/1
5-10 TABLET ORAL DAILY
Qty: 30 TABLET | Refills: 0 | Status: SHIPPED | OUTPATIENT
Start: 2025-07-15

## 2025-05-21 RX ORDER — BUSPIRONE HYDROCHLORIDE 10 MG/1
10 TABLET ORAL 2 TIMES DAILY
Qty: 180 TABLET | Refills: 1 | Status: SHIPPED | OUTPATIENT
Start: 2025-05-21 | End: 2025-11-17

## 2025-05-21 RX ORDER — DEXTROAMPHETAMINE SACCHARATE, AMPHETAMINE ASPARTATE, DEXTROAMPHETAMINE SULFATE AND AMPHETAMINE SULFATE 7.5; 7.5; 7.5; 7.5 MG/1; MG/1; MG/1; MG/1
30 TABLET ORAL 2 TIMES DAILY
Qty: 60 TABLET | Refills: 0 | Status: SHIPPED | OUTPATIENT
Start: 2025-05-21

## 2025-05-21 RX ORDER — DEXTROAMPHETAMINE SACCHARATE, AMPHETAMINE ASPARTATE, DEXTROAMPHETAMINE SULFATE AND AMPHETAMINE SULFATE 2.5; 2.5; 2.5; 2.5 MG/1; MG/1; MG/1; MG/1
5-10 TABLET ORAL DAILY
Qty: 30 TABLET | Refills: 0 | Status: SHIPPED | OUTPATIENT
Start: 2025-05-21

## 2025-05-21 RX ORDER — DEXTROAMPHETAMINE SACCHARATE, AMPHETAMINE ASPARTATE, DEXTROAMPHETAMINE SULFATE AND AMPHETAMINE SULFATE 7.5; 7.5; 7.5; 7.5 MG/1; MG/1; MG/1; MG/1
30 TABLET ORAL 2 TIMES DAILY
Qty: 60 TABLET | Refills: 0 | Status: SHIPPED | OUTPATIENT
Start: 2025-07-15

## 2025-05-21 RX ORDER — DEXTROAMPHETAMINE SACCHARATE, AMPHETAMINE ASPARTATE, DEXTROAMPHETAMINE SULFATE AND AMPHETAMINE SULFATE 7.5; 7.5; 7.5; 7.5 MG/1; MG/1; MG/1; MG/1
30 TABLET ORAL 2 TIMES DAILY
Qty: 60 TABLET | Refills: 0 | Status: SHIPPED | OUTPATIENT
Start: 2025-06-18

## 2025-05-21 RX ORDER — DEXTROAMPHETAMINE SACCHARATE, AMPHETAMINE ASPARTATE, DEXTROAMPHETAMINE SULFATE AND AMPHETAMINE SULFATE 2.5; 2.5; 2.5; 2.5 MG/1; MG/1; MG/1; MG/1
5-10 TABLET ORAL DAILY
Qty: 30 TABLET | Refills: 0 | Status: SHIPPED | OUTPATIENT
Start: 2025-06-18

## 2025-05-21 NOTE — PROGRESS NOTES
"MEDICATION MANAGEMENT SESSION: VIRTUAL    Name: Elida Ricks  Age: 29 y.o.  : 1995    Preferred Name: Elida    Site: [facility]--via virtual visit with synchronous audio and video. Patient presented at home, in the state [facility].   Each patient to whom medical services by telemedicine is provided is:   (1) informed of the relationship between the physician and patient and the respective role of any other health care provider with respect to management of the patient;   (2) notified that he or she may decline to receive medical services by telemedicine and may withdraw from such care at any time.     Referring provider: Juliette Cook NP     Reason for Visit:  Medication follow up    History of Present Illness    CHIEF COMPLAINT:  Patient presents for medication management, specifically to discuss discontinuing Prozac and Buspar while continuing Adderall, and to address irritability and anxiety symptoms.    HPI:  Patient accidentally stopped taking Prozac and Buspar at the beginning of May when she went on vacation and left her medications behind. She reports feeling better off these medications, noting that Prozac may have increased her anxiety. She has been off these medications for about three weeks.    Patient continues to take Adderall for work, stating it helps her function and focus on tasks. She typically does not take Adderall on weekends to feel "normal," but sometimes takes a quarter dose to avoid feeling sluggish or experiencing withdrawal symptoms. When not taking Adderall, she experiences withdrawal-like symptoms, including feeling sluggish, having headaches, and wanting to sleep a lot. These symptoms typically occur on weekends when she does not take the medication.    Patient reports experiencing irritability, describing it as having a "short fuse" at times, particularly noticeable in interactions with her children. She expresses concern about her reactions and experiences guilt " "about not being more patient.    Patient is currently in therapy, addressing trauma responses and learning to navigate life as a single mother. Her therapist suggests that some of her irritability may be related to trauma responses and the challenges of single parenthood.    Patient works full-time and is a single parent, which she acknowledges as challenging. She expresses a desire to raise "good humans" and is working on developing empathy in her children while also learning to be more patient with herself.    Patient denies experiencing withdrawal symptoms from stopping Prozac and Buspar, except for a mild headache on the first day.    MEDICATIONS:  Patient was on Prozac for depression/anxiety but discontinued it as she felt more anxious sometimes. She was also on Buspar for anxiety, which she has discontinued. Currently, she is on Adderall daily (except weekends) for focus at work. The medication helps her function, but she experiences withdrawal symptoms on weekends, does not take it on weekends.    LIFESTYLE:  Patient works full-time and takes Adderall to help function at work, specifically to focus and complete tasks. She is a single mother living with her children.         ADHD: inattentive, easily distracted   Depressive Disorder: Irritable mood   Anxiety Disorder: Irritability, anxiousness/nervousness, worry   Panic Disorder: denied   Manic Disorder: denied   Psychotic Disorder: denied   Substance Use:  denied     Review of Systems   Constitutional:  Positive for fatigue. Negative for activity change, appetite change and unexpected weight change.   Respiratory:  Negative for shortness of breath.    Psychiatric/Behavioral:  Negative for agitation, behavioral problems, confusion, decreased concentration, dysphoric mood, hallucinations, self-injury, sleep disturbance and suicidal ideas. The patient is nervous/anxious. The patient is not hyperactive.       Constitutional:  Vitals:  Most recent vital signs were " "reviewed.   Last 3 sets of Vitals        5/26/2024     1:00 PM 8/27/2024     9:04 AM 11/9/2024     4:39 PM   Vitals - 1 value per visit   SYSTOLIC 130 122 132   DIASTOLIC 63 80 86   Pulse 104  132   Temp 98.8 °F (37.1 °C)  99.3 °F (37.4 °C)   Resp 16  20   SPO2 99 %  99 %   Weight (lb) 190 204 200   Weight (kg) 86.183 92.534 90.719   Height 5' (1.524 m) 5' (1.524 m) 5' (1.524 m)   BMI (Calculated) 37.1 39.8 39.1   Pain Score   Nine          Psychiatric:  Oriented: x 3   Attitude: cooperative   Eye Contact: good   Behavior: wnl   Mood: "okay"  Affect: appropriate range   Attention: intact   Concentration: grossly intact   Thought Process: goal directed   Speech: intelligible  Volume: WNL   Quantity: WNL   Rhythm: WNL  Insight: fair to good   Threats: no SI / HI   Memory: Intact  Psychosis: denies all   Estimate of Intellectual Function: average   Judgment:  good  Relevant Elements of Neurological Exam: normal gait     Allergy Review:   Review of patient's allergies indicates:  No Known Allergies     Medical Problem List:   Problem List[1]     Encounter Diagnoses   Name Primary?    ADHD (attention deficit hyperactivity disorder), combined type     Major depressive disorder, recurrent episode, moderate     PRASANNA (generalized anxiety disorder) Yes    Anxiety and depression         PLAN:    Assessment & Plan    F32.9 Major depressive disorder, single episode, unspecified  F41.9 Anxiety disorder, unspecified  F19.939 Other psychoactive substance use, unspecified with withdrawal, unspecified  R45.4 Irritability and anger  Z63.5 Disruption of family by separation and divorce    IMPRESSION:  - Discontinued Prozac as patient reported feeling better without it and no significant withdrawal symptoms.  - Restarted Buspar 10 mg twice daily to address anxiety and irritability, particularly related to parenting stress.  - Continued Adderall for focus and task completion at work, acknowledging potential withdrawal effects on " weekends.  - Considered therapy engagement and single parent challenges in treatment approach.    PLAN SUMMARY:  1. Continue Adderall  2. Discontinue Prozac  3. Restart Buspar 10 mg twice daily  4. Informed about common Adderall withdrawal symptoms  5. Patient to continue explaining and apologizing to children after reactive moments  6. Contact the office if there are any issues or need to discuss alternative treatments    PLAN NOTE:  1. Explained Buspar is specifically for anxiety, unlike Prozac which also functions as an antidepressant. Discussed that Buspar effects are often subtle, with patients noticing reduced reactivity and increased patience over time.  2. Restarted Buspar 10 mg twice daily.  3. Informed about common Adderall withdrawal symptoms, including irritability, especially when timing coincides with family interactions.  4. Patient to give herself rogerio while navigating the challenges of single parenthood and full-time work.  5. Patient to continue explaining and apologizing to children after reactive moments, noting it helps develop empathy in children.  6. Discontinued Prozac.  7. Continued Adderall.  8. Contact the office if there are any issues or need to discuss alternative treatments.        Follow up in about 3 months (around 8/21/2025) for Medication follow up.     Medication List with Changes/Refills   Current Medications    FLUOXETINE 20 MG CAPSULE    Take 1 capsule (20 mg total) by mouth once daily.    FLUOXETINE 40 MG CAPSULE    Take 1 capsule (40 mg total) by mouth once daily. Take 1 capsule (40 mg) by mouth once daily with Prozac 20 mg for total daily dose of 60 mg.    NORGESTIMATE-ETHINYL ESTRADIOL (ORTHO-CYCLEN) 0.25-35 MG-MCG PER TABLET    Take 1 tablet by mouth once daily.   Changed and/or Refilled Medications    Modified Medication Previous Medication    BUSPIRONE (BUSPAR) 10 MG TABLET busPIRone (BUSPAR) 15 MG tablet       Take 1 tablet (10 mg total) by mouth 2 (two) times daily.     Take 1 tablet (15 mg total) by mouth 2 (two) times daily.    DEXTROAMPHETAMINE-AMPHETAMINE (ADDERALL) 10 MG TAB dextroamphetamine-amphetamine (ADDERALL) 10 mg Tab       Take 0.5-1 tablets (5-10 mg total) by mouth Daily.    Take 0.5-1 tablets (5-10 mg total) by mouth Daily.    DEXTROAMPHETAMINE-AMPHETAMINE (ADDERALL) 10 MG TAB dextroamphetamine-amphetamine (ADDERALL) 10 mg Tab       Take 0.5-1 tablets (5-10 mg total) by mouth Daily.    Take 0.5-1 tablets (5-10 mg total) by mouth Daily.    DEXTROAMPHETAMINE-AMPHETAMINE (ADDERALL) 10 MG TAB dextroamphetamine-amphetamine (ADDERALL) 10 mg Tab       Take 0.5-1 tablets (5-10 mg total) by mouth Daily.    Take 0.5-1 tablets (5-10 mg total) by mouth Daily.    DEXTROAMPHETAMINE-AMPHETAMINE (ADDERALL) 30 MG TAB dextroamphetamine-amphetamine (ADDERALL) 30 mg Tab       Take 1 tablet (30 mg total) by mouth 2 (two) times a day.    Take 1 tablet (30 mg total) by mouth 2 (two) times a day.    DEXTROAMPHETAMINE-AMPHETAMINE (ADDERALL) 30 MG TAB dextroamphetamine-amphetamine (ADDERALL) 30 mg Tab       Take 1 tablet (30 mg total) by mouth 2 (two) times a day.    Take 1 tablet (30 mg total) by mouth 2 (two) times a day.    DEXTROAMPHETAMINE-AMPHETAMINE (ADDERALL) 30 MG TAB dextroamphetamine-amphetamine (ADDERALL) 30 mg Tab       Take 1 tablet (30 mg total) by mouth 2 (two) times a day.    Take 1 tablet (30 mg total) by mouth 2 (two) times a day.      Time spent with pt including note preparation: 40 minutes     Yadi Frank, PhD, MP  Medical Psychologist    This note was generated with the assistance of ambient listening technology. Verbal consent was obtained by the patient and accompanying visitor(s) for the recording of patient appointment to facilitate this note. I attest to having reviewed and edited the generated note for accuracy, though some syntax or spelling errors may persist. Please contact the author of this note for any clarification.            [1]   Patient Active  Problem List  Diagnosis    Major depressive disorder with single episode, in remission

## 2025-08-21 ENCOUNTER — OFFICE VISIT (OUTPATIENT)
Dept: PSYCHIATRY | Facility: CLINIC | Age: 30
End: 2025-08-21
Payer: COMMERCIAL

## 2025-08-21 DIAGNOSIS — F41.9 ANXIETY AND DEPRESSION: ICD-10-CM

## 2025-08-21 DIAGNOSIS — F32.A ANXIETY AND DEPRESSION: ICD-10-CM

## 2025-08-21 DIAGNOSIS — F90.2 ADHD (ATTENTION DEFICIT HYPERACTIVITY DISORDER), COMBINED TYPE: ICD-10-CM

## 2025-08-21 DIAGNOSIS — F33.1 MAJOR DEPRESSIVE DISORDER, RECURRENT EPISODE, MODERATE: ICD-10-CM

## 2025-08-21 DIAGNOSIS — F41.1 GAD (GENERALIZED ANXIETY DISORDER): Primary | ICD-10-CM

## 2025-08-21 RX ORDER — DEXTROAMPHETAMINE SACCHARATE, AMPHETAMINE ASPARTATE, DEXTROAMPHETAMINE SULFATE AND AMPHETAMINE SULFATE 2.5; 2.5; 2.5; 2.5 MG/1; MG/1; MG/1; MG/1
5-10 TABLET ORAL DAILY
Qty: 30 TABLET | Refills: 0 | Status: SHIPPED | OUTPATIENT
Start: 2025-10-13

## 2025-08-21 RX ORDER — DEXTROAMPHETAMINE SACCHARATE, AMPHETAMINE ASPARTATE, DEXTROAMPHETAMINE SULFATE AND AMPHETAMINE SULFATE 7.5; 7.5; 7.5; 7.5 MG/1; MG/1; MG/1; MG/1
30 TABLET ORAL 2 TIMES DAILY
Qty: 60 TABLET | Refills: 0 | Status: SHIPPED | OUTPATIENT
Start: 2025-08-21

## 2025-08-21 RX ORDER — DEXTROAMPHETAMINE SACCHARATE, AMPHETAMINE ASPARTATE, DEXTROAMPHETAMINE SULFATE AND AMPHETAMINE SULFATE 7.5; 7.5; 7.5; 7.5 MG/1; MG/1; MG/1; MG/1
30 TABLET ORAL 2 TIMES DAILY
Qty: 60 TABLET | Refills: 0 | Status: SHIPPED | OUTPATIENT
Start: 2025-10-13

## 2025-08-21 RX ORDER — DEXTROAMPHETAMINE SACCHARATE, AMPHETAMINE ASPARTATE, DEXTROAMPHETAMINE SULFATE AND AMPHETAMINE SULFATE 2.5; 2.5; 2.5; 2.5 MG/1; MG/1; MG/1; MG/1
5-10 TABLET ORAL DAILY
Qty: 30 TABLET | Refills: 0 | Status: SHIPPED | OUTPATIENT
Start: 2025-09-17

## 2025-08-21 RX ORDER — DEXTROAMPHETAMINE SACCHARATE, AMPHETAMINE ASPARTATE, DEXTROAMPHETAMINE SULFATE AND AMPHETAMINE SULFATE 2.5; 2.5; 2.5; 2.5 MG/1; MG/1; MG/1; MG/1
5-10 TABLET ORAL DAILY
Qty: 30 TABLET | Refills: 0 | Status: SHIPPED | OUTPATIENT
Start: 2025-08-21

## 2025-08-21 RX ORDER — BUSPIRONE HYDROCHLORIDE 10 MG/1
10 TABLET ORAL 2 TIMES DAILY
Qty: 180 TABLET | Refills: 1 | Status: SHIPPED | OUTPATIENT
Start: 2025-08-21 | End: 2026-02-17

## 2025-08-21 RX ORDER — DEXTROAMPHETAMINE SACCHARATE, AMPHETAMINE ASPARTATE, DEXTROAMPHETAMINE SULFATE AND AMPHETAMINE SULFATE 7.5; 7.5; 7.5; 7.5 MG/1; MG/1; MG/1; MG/1
30 TABLET ORAL 2 TIMES DAILY
Qty: 60 TABLET | Refills: 0 | Status: SHIPPED | OUTPATIENT
Start: 2025-09-17